# Patient Record
Sex: FEMALE | Race: WHITE | HISPANIC OR LATINO | Employment: PART TIME | URBAN - METROPOLITAN AREA
[De-identification: names, ages, dates, MRNs, and addresses within clinical notes are randomized per-mention and may not be internally consistent; named-entity substitution may affect disease eponyms.]

---

## 2019-09-13 ENCOUNTER — OCCUPATIONAL HEALTH (OUTPATIENT)
Dept: URGENT CARE | Facility: CLINIC | Age: 39
End: 2019-09-13

## 2019-09-13 DIAGNOSIS — Z02.1 PRE-EMPLOYMENT EXAMINATION: Primary | ICD-10-CM

## 2019-09-13 PROCEDURE — 86799 MMR TITER: ICD-10-PCS | Mod: S$GLB,,, | Performed by: PREVENTIVE MEDICINE

## 2019-09-13 PROCEDURE — 86480 QUANTIFERON GOLD TB: ICD-10-PCS | Mod: S$GLB,,, | Performed by: PREVENTIVE MEDICINE

## 2019-09-13 PROCEDURE — 86706 HEP B SURFACE ANTIBODY: CPT | Mod: S$GLB,,, | Performed by: PREVENTIVE MEDICINE

## 2019-09-13 PROCEDURE — 86787 VARICELLA-ZOSTER ANTIBODY: CPT | Mod: ,,, | Performed by: PREVENTIVE MEDICINE

## 2019-09-13 PROCEDURE — 86799 MMR TITER: CPT | Mod: S$GLB,,, | Performed by: PREVENTIVE MEDICINE

## 2019-09-13 PROCEDURE — 86480 TB TEST CELL IMMUN MEASURE: CPT | Mod: S$GLB,,, | Performed by: PREVENTIVE MEDICINE

## 2019-09-13 PROCEDURE — 86787 PR  VARICELLA-ZOSTER: ICD-10-PCS | Mod: ,,, | Performed by: PREVENTIVE MEDICINE

## 2019-09-13 PROCEDURE — 86706 PR  HEPATITIS B SURFACE AB TEST: ICD-10-PCS | Mod: S$GLB,,, | Performed by: PREVENTIVE MEDICINE

## 2019-09-16 LAB
GAMMA INTERFERON BACKGROUND BLD IA-ACNC: 0.03 IU/ML
HBV SURFACE AB SER-ACNC: 65 MIU/ML
M TB IFN-G BLD-IMP: NEGATIVE
M TB IFN-G CD4+ BCKGRND COR BLD-ACNC: 0.03 IU/ML
MEV IGG SER IA-ACNC: <13.5 AU/ML
MITOGEN IGNF BLD-ACNC: >10 IU/ML
MUV IGG SER IA-ACNC: 90.9 AU/ML
QUANTIFERON TB GOLD (INCUBATED): NORMAL
QUANTIFERON TB2 AG VALUE: 0.03 IU/ML
RUBV IGG SERPL IA-ACNC: 5.32 INDEX
SERVICE CMNT-IMP: NORMAL
VZV IGG SER IA-ACNC: 518 INDEX

## 2020-09-01 PROBLEM — M79.645 BILATERAL THUMB PAIN: Status: ACTIVE | Noted: 2020-09-01

## 2020-09-01 PROBLEM — M79.644 BILATERAL THUMB PAIN: Status: ACTIVE | Noted: 2020-09-01

## 2021-05-04 ENCOUNTER — PATIENT MESSAGE (OUTPATIENT)
Dept: RESEARCH | Facility: HOSPITAL | Age: 41
End: 2021-05-04

## 2024-09-19 ENCOUNTER — OFFICE VISIT (OUTPATIENT)
Dept: HEMATOLOGY/ONCOLOGY | Facility: CLINIC | Age: 44
End: 2024-09-19
Payer: COMMERCIAL

## 2024-09-19 VITALS
DIASTOLIC BLOOD PRESSURE: 79 MMHG | WEIGHT: 150.13 LBS | TEMPERATURE: 98 F | HEART RATE: 74 BPM | SYSTOLIC BLOOD PRESSURE: 117 MMHG | BODY MASS INDEX: 30.27 KG/M2 | OXYGEN SATURATION: 99 % | HEIGHT: 59 IN

## 2024-09-19 DIAGNOSIS — Z17.0 MALIGNANT NEOPLASM OF CENTRAL PORTION OF LEFT BREAST IN FEMALE, ESTROGEN RECEPTOR POSITIVE: Primary | ICD-10-CM

## 2024-09-19 DIAGNOSIS — C79.89 SECONDARY MALIGNANT NEOPLASM OF AXILLA: ICD-10-CM

## 2024-09-19 DIAGNOSIS — C50.112 MALIGNANT NEOPLASM OF CENTRAL PORTION OF LEFT BREAST IN FEMALE, ESTROGEN RECEPTOR POSITIVE: Primary | ICD-10-CM

## 2024-09-19 PROCEDURE — 99999 PR PBB SHADOW E&M-NEW PATIENT-LVL III: CPT | Mod: PBBFAC,,, | Performed by: INTERNAL MEDICINE

## 2024-09-19 PROCEDURE — 99205 OFFICE O/P NEW HI 60 MIN: CPT | Mod: S$GLB,,, | Performed by: INTERNAL MEDICINE

## 2024-09-19 PROCEDURE — G2211 COMPLEX E/M VISIT ADD ON: HCPCS | Mod: S$GLB,,, | Performed by: INTERNAL MEDICINE

## 2024-09-19 RX ORDER — ESCITALOPRAM OXALATE 10 MG/1
10 TABLET ORAL
COMMUNITY
Start: 2021-08-16

## 2024-09-19 NOTE — PLAN OF CARE
START ON PATHWAY REGIMEN - Breast    [id number]        Cyclophosphamide       Docetaxel (Taxotere)     **Always confirm dose/schedule in your pharmacy ordering system**    Patient Characteristics:  Postoperative without Neoadjuvant Therapy, M0 (Pathologic Staging), Invasive   Disease, Adjuvant Therapy, HER2 Negative, ER Positive, Node Positive, Node   Positive (1-3), Oncotyping Ordered, Oncotype Low/Intermediate Risk (? 25),   Premenopausal, Chemotherapy Candidate  Therapeutic Status: Postoperative without Neoadjuvant Therapy, M0 (Pathologic   Staging)  AJCC Grade: G2  AJCC N Category: pN1a  AJCC M Category: cM0  ER Status: Positive (+)  AJCC 8 Stage Grouping: IA  HER2 Status: Equivocal  Oncotype Dx Recurrence Score: Not Appropriate  AJCC T Category: pT1c  DC Status: Positive (+)  Has this patient completed genomic testing<= Yes - Oncotype DX(R)  Menopausal Status: Premenopausal  Intent of Therapy:  Curative Intent, Discussed with Patient

## 2024-09-19 NOTE — PROGRESS NOTES
[facility]/ [facility]   at [facility] Note:      Chief Complaint:   Encounter Diagnoses   Name Primary?    Malignant neoplasm of central portion of left breast in female, estrogen receptor positive Yes    Secondary malignant neoplasm of axilla         Cancer Staging   Malignant neoplasm of central portion of left breast in female, estrogen receptor positive  Staging form: Breast, AJCC 8th Edition  - Pathologic stage from 2024: Stage IA (pT1c, pN1a, cM0, G2, ER+, ND+, HER2: Equivocal) - Signed by Ana Shipley MD on 2024      HPI:  Bhargavi Gonzalez is a 44 y.o. female who presents today for evaluation of newly diagnosed breast cancer.     Oncology History  Patient self palpated a left breast mass in 2023 but had delay due to relocation  Bilateral breast ultrasound 24 showed L breast nodule at 6oc posterior to NAC     L breast bx 24: IDC, grade 2, ER >95%/ ND >95%/ HER2 1+; Ki67 5%    Bilateral mastectomy SLNBx 24: IDC, grade 2, 1.7cm maximal dimension; 1/1 LN+ with EXE  L axillary dissection 0/8 LN+     Invitae 7/15/24 negative     Gyn History:   Menarche: 10  Menopause: premenopause    Age at first pregnancy: 21      Social History     Tobacco Use    Smoking status: Never    Smokeless tobacco: Never   Substance Use Topics    Alcohol use: Not Currently     Family History   Problem Relation Name Age of Onset    Diabetes Mother      Hypertension Mother      Depression Sister      No Known Problems Brother      Arthritis Maternal Grandmother      Hypertension Maternal Grandmother       Past Medical History:   Diagnosis Date    Shoulder pain, left     no known injury     Past Surgical History:   Procedure Laterality Date    BREAST BIOPSY Right 2017    fibroadenoma    TUBAL LIGATION         Patient Active Problem List   Diagnosis    Bilateral thumb pain    Malignant neoplasm of central portion of left breast in  "female, estrogen receptor positive       Current Outpatient Medications   Medication Instructions    amoxicillin-clavulanate 500-125mg (AUGMENTIN) 500-125 mg Tab No dose, route, or frequency recorded.    EScitalopram oxalate (LEXAPRO) 10 mg, Oral    fluticasone propionate (FLONASE) 50 mcg, Each Nostril, Daily    loratadine (CLARITIN) 10 mg, Oral, Daily    meclizine (ANTIVERT) 25 mg, Oral, 3 times daily PRN       Review of Systems:   Review of Systems   Constitutional: Negative.    HENT: Negative.     Respiratory: Negative.     Cardiovascular: Negative.    Gastrointestinal: Negative.    Musculoskeletal: Negative.    Endo/Heme/Allergies: Negative.    All other systems reviewed and are negative.      PHYSICAL EXAM:  /79 (BP Location: Right arm, Patient Position: Sitting, BP Method: Medium (Automatic))   Pulse 74   Temp 98 °F (36.7 °C) (Oral)   Ht 4' 11" (1.499 m)   Wt 68.1 kg (150 lb 2.1 oz)   SpO2 99%   BMI 30.32 kg/m²     General Appearance:    Alert, cooperative, no distress, appears stated age   Head:    Normocephalic, without obvious abnormality, atraumatic   Eyes:    PERRL, conjunctiva/corneas clear   Nose:   Nares normal, septum midline   Throat:   Lips, mucosa, and tongue normal; teeth and gums normal   Lungs:     Respirations unlabored   Extremities:   Extremities normal, atraumatic, no cyanosis or edema   Pulses:   2+ and symmetric all extremities   Skin:   Skin color, texture, turgor normal, no rashes or lesions   Neurologic:   CNII-XII intact, normal gait         Pertinent Labs & Imaging:  Pathology Results  (Last 30 days)      None            No results found for this or any previous visit (from the past 24 hour(s)).    Assessment & Plan:    1. 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 Secondary malignant neoplasm of axilla    Reviewed patients referring notes, imaging and pathology. Discussed diagnosis, staging, and treatment in detail " with patient.   Patient with stage I ER+ breast cancer with 1/9 LN+  Long discussion of  vs SOC TC x 4   Patient previously decided for TC x 4 and feels more comfortable with this decision. Given this, will opt against oncotype testing  We discussed treatment with Taxotere 75mg/m2 and Cytoxan 600mg/m2 q3wk x4. Discussed the results of Joint Analysis of ABC Trials presented 6/4/16 at [facility]. Overall side effects of chemotherapy were discussed including alopecia (loss of hair),  immunosuppression, Neutropenia (low white count), anemia (low hemoglobin), thrombocytopenia (lowered platelets), and neuropathy (especially of the fingers and toes) from Taxotere. Other side effects such as nail changes, dry and itchy eyes, mouth sores, and bone pain were discussed. The need for growth factor support was also discussed. Neulasta related bone pain was discussed. Rare but serious side effects  such as increased risk of secondary leukemia with Cytoxan were also discussed as well.     Patient prefers against port placement. Discussed possible scarring.     Follow up for first chemotherapy       Route Chart for Scheduling    Med Onc Chart Routing      Follow up with physician . First day of chemo   Follow up with RAMYA . Virtual chemo teaching prior to 9/26   Infusion scheduling note   chemo start 9/27 if possible   Injection scheduling note    Labs CBC, CMP and B HCG   Scheduling:  Preferred lab:  Lab interval:     Imaging    Pharmacy appointment    Other referrals                       Total time of this visit, including time spent face to face with patient and/or via video/audio, and also in preparing for today's visit for MDM and documentation. (Medical Decision Making, including consideration of possible diagnoses, management options, complex medical record review, review of diagnostic tests and information, consideration and discussion of significant complications based on comorbidities, and discussion with providers  involved with the care of the patient) 60 minutes. Greater than 50% was spent face to face with the patient counseling and coordinating care.      Ana Shipley MD   09/19/2024

## 2024-09-24 ENCOUNTER — TELEPHONE (OUTPATIENT)
Dept: INFUSION THERAPY | Facility: HOSPITAL | Age: 44
End: 2024-09-24
Payer: COMMERCIAL

## 2024-09-25 ENCOUNTER — OFFICE VISIT (OUTPATIENT)
Dept: HEMATOLOGY/ONCOLOGY | Facility: CLINIC | Age: 44
End: 2024-09-25
Payer: COMMERCIAL

## 2024-09-25 DIAGNOSIS — T45.1X5A CHEMOTHERAPY-INDUCED NAUSEA: ICD-10-CM

## 2024-09-25 DIAGNOSIS — R11.0 CHEMOTHERAPY-INDUCED NAUSEA: ICD-10-CM

## 2024-09-25 DIAGNOSIS — Z17.0 MALIGNANT NEOPLASM OF CENTRAL PORTION OF LEFT BREAST IN FEMALE, ESTROGEN RECEPTOR POSITIVE: Primary | ICD-10-CM

## 2024-09-25 DIAGNOSIS — G62.0 CHEMOTHERAPY-INDUCED NEUROPATHY: ICD-10-CM

## 2024-09-25 DIAGNOSIS — C50.112 MALIGNANT NEOPLASM OF CENTRAL PORTION OF LEFT BREAST IN FEMALE, ESTROGEN RECEPTOR POSITIVE: Primary | ICD-10-CM

## 2024-09-25 DIAGNOSIS — T45.1X5A CHEMOTHERAPY-INDUCED NEUROPATHY: ICD-10-CM

## 2024-09-25 PROCEDURE — 99215 OFFICE O/P EST HI 40 MIN: CPT | Mod: 95,,, | Performed by: NURSE PRACTITIONER

## 2024-09-25 PROCEDURE — G2211 COMPLEX E/M VISIT ADD ON: HCPCS | Mod: 95,,, | Performed by: NURSE PRACTITIONER

## 2024-09-25 RX ORDER — ONDANSETRON HYDROCHLORIDE 8 MG/1
8 TABLET, FILM COATED ORAL EVERY 8 HOURS PRN
Qty: 30 TABLET | Refills: 2 | Status: SHIPPED | OUTPATIENT
Start: 2024-09-25 | End: 2025-09-25

## 2024-09-25 RX ORDER — PROCHLORPERAZINE MALEATE 5 MG
10 TABLET ORAL EVERY 6 HOURS PRN
Qty: 20 TABLET | Refills: 11 | Status: SHIPPED | OUTPATIENT
Start: 2024-09-25

## 2024-09-25 RX ORDER — OLANZAPINE 5 MG/1
TABLET ORAL
Qty: 3 TABLET | Refills: 5 | Status: SHIPPED | OUTPATIENT
Start: 2024-09-25

## 2024-09-25 NOTE — Clinical Note
She is on the wait list for Friday, but would prefer to start sometime next week if possible.  Michaela, she will need labs the day before.  If we can get a provider visit great, if not that is fine since she just saw me.  Thanks.

## 2024-09-25 NOTE — PROGRESS NOTES
[facility]/ [facility]   at [facility] Note:    The patient location is: home/la  The chief complaint leading to consultation is: chemo education    Visit type: audiovisual    Face to Face time with patient: 45  60 minutes of total time spent on the encounter, which includes face to face time and non-face to face time preparing to see the patient (eg, review of tests), Obtaining and/or reviewing separately obtained history, Documenting clinical information in the electronic or other health record, Independently interpreting results (not separately reported) and communicating results to the patient/family/caregiver, or Care coordination (not separately reported).     Each patient to whom he or she provides medical services by telemedicine is:  (1) informed of the relationship between the physician and patient and the respective role of any other health care provider with respect to management of the patient; and (2) notified that he or she may decline to receive medical services by telemedicine and may withdraw from such care at any time.    Notes:     Chief Complaint:   Encounter Diagnoses   Name Primary?    Malignant neoplasm of central portion of left breast in female, estrogen receptor positive Yes    Chemotherapy-induced nausea         Cancer Staging   Malignant neoplasm of central portion of left breast in female, estrogen receptor positive  Staging form: Breast, AJCC 8th Edition  - Pathologic stage from 8/2/2024: Stage IA (pT1c, pN1a, cM0, G2, ER+, IA+, HER2: Equivocal) - Signed by Ana Shipley MD on 9/19/2024      HPI:  Bhargavi Gonzalez is a 44 y.o. female who presents today for evaluation of newly diagnosed breast cancer.     Per Dr. Shipley's note:   Oncology History  Patient self palpated a left breast mass in December 2023 but had delay due to relocation  Bilateral breast ultrasound 6/21/24 showed L breast nodule at 6oc posterior to NAC     L  breast bx 24: IDC, grade 2, ER >95%/ AL >95%/ HER2 1+; Ki67 5%    Bilateral mastectomy SLNBx 24: IDC, grade 2, 1.7cm maximal dimension; 1/ LN+ with EXE  L axillary dissection 0 LN+     Invitae 7/15/24 negative     Per pt she recently received a PET scan in [place].  She does not have the results yet, but will get them to us when she does    Gyn History:   Menarche: 10  Menopause: premenopause    Age at first pregnancy: 21      Social History     Tobacco Use    Smoking status: Never    Smokeless tobacco: Never   Substance Use Topics    Alcohol use: Not Currently     Family History   Problem Relation Name Age of Onset    Diabetes Mother      Hypertension Mother      Depression Sister      No Known Problems Brother      Arthritis Maternal Grandmother      Hypertension Maternal Grandmother       Past Medical History:   Diagnosis Date    Shoulder pain, left     no known injury     Past Surgical History:   Procedure Laterality Date    BREAST BIOPSY Right 2017    fibroadenoma    TUBAL LIGATION         Patient Active Problem List   Diagnosis    Bilateral thumb pain    Malignant neoplasm of central portion of left breast in female, estrogen receptor positive       Current Outpatient Medications   Medication Instructions    amoxicillin-clavulanate 500-125mg (AUGMENTIN) 500-125 mg Tab No dose, route, or frequency recorded.    EScitalopram oxalate (LEXAPRO) 10 mg, Oral    fluticasone propionate (FLONASE) 50 mcg, Each Nostril, Daily    loratadine (CLARITIN) 10 mg, Oral, Daily    meclizine (ANTIVERT) 25 mg, Oral, 3 times daily PRN    OLANZapine (ZYPREXA) 5 MG tablet Take 1 tablet by mouth nightly on days 1-3 of each chemotherapy cycle.    ondansetron (ZOFRAN) 8 mg, Oral, Every 8 hours PRN    prochlorperazine (COMPAZINE) 10 mg, Oral, Every 6 hours PRN       Review of Systems:   Review of Systems   Constitutional: Negative.    HENT: Negative.     Respiratory: Negative.     Cardiovascular: Negative.     Gastrointestinal: Negative.    Musculoskeletal: Negative.    Endo/Heme/Allergies: Negative.    All other systems reviewed and are negative.      PHYSICAL EXAM:  There were no vitals taken for this visit.    Limited pe d/t virtual visit    Physical Exam  Constitutional:       General: She is not in acute distress.     Appearance: Normal appearance. She is not ill-appearing.   HENT:      Head: Normocephalic and atraumatic.   Pulmonary:      Effort: Pulmonary effort is normal. No respiratory distress.   Musculoskeletal:      Cervical back: Normal range of motion.   Neurological:      Mental Status: She is alert and oriented to person, place, and time.         Pertinent Labs & Imaging:  Pathology Results  (Last 30 days)      None            No results found for this or any previous visit (from the past 24 hour(s)).    Assessment & Plan:    1. Malignant neoplasm of central portion of left breast in female, estrogen receptor positive  - OLANZapine (ZYPREXA) 5 MG tablet; Take 1 tablet by mouth nightly on days 1-3 of each chemotherapy cycle.  Dispense: 3 tablet; Refill: 5  - prochlorperazine (COMPAZINE) 5 MG tablet; Take 2 tablets (10 mg total) by mouth every 6 (six) hours as needed for Nausea.  Dispense: 20 tablet; Refill: 11  - ondansetron (ZOFRAN) 8 MG tablet; Take 1 tablet (8 mg total) by mouth every 8 (eight) hours as needed for Nausea.  Dispense: 30 tablet; Refill: 2    2. Chemotherapy-induced nausea  - ondansetron (ZOFRAN) 8 MG tablet; Take 1 tablet (8 mg total) by mouth every 8 (eight) hours as needed for Nausea.  Dispense: 30 tablet; Refill: 2    Per Dr. Shipley's note:   Reviewed patients referring notes, imaging and pathology. Discussed diagnosis, staging, and treatment in detail with patient.   Patient with stage I ER+ breast cancer with 1/9 LN+  Long discussion of  vs SOC TC x 4   Patient previously decided for TC x 4 and feels more comfortable with this decision. Given this, will opt against oncotype  testing  We discussed treatment with Taxotere 75mg/m2 and Cytoxan 600mg/m2 q3wk x4. Discussed the results of Joint Analysis of ABC Trials presented 6/4/16 at [facility]. Overall side effects of chemotherapy were discussed including alopecia (loss of hair),  immunosuppression, Neutropenia (low white count), anemia (low hemoglobin), thrombocytopenia (lowered platelets), and neuropathy (especially of the fingers and toes) from Taxotere. Other side effects such as nail changes, dry and itchy eyes, mouth sores, and bone pain were discussed. The need for growth factor support was also discussed. Neulasta related bone pain was discussed. Rare but serious side effects  such as increased risk of secondary leukemia with Cytoxan were also discussed as well.     Patient prefers against port placement. Discussed possible scarring.   Follow up for first chemotherapy   Pt had a PET scan recently done in [place].  She will get us these results.  Continue with C1D1 of TC as planned  Will refer for IO and acupuncture  She plans on using cool cap    Bhargavi Gonzalez was consented for chemotherapy/immunotherapy to treat breast cancer. Bhargavi Gonzalez was consented to receive taxotere, cyclophosphamide.   The consent was discussed and reviewed with patient.  Consent reviewed today, but not signed d/t virtual visit    2. Patient was education on what to expect when receiving chemotherapy including: checking in, receiving an ID band, 1 guest allowed in the infusion suite during infusion, can alternate people as well, pole going with you once you are hooked up, warm blankets are available, you may bring lunch and snacks, minimal snacks are available, take medications as regularly unless told otherwise, warm blankets, will be available. Education about what to expect during their chemo cycle and how often their regimen is given.     3. An extensive discussion was had which included a thorough discussion of the risk and benefits of treatment  and alternatives.  Risks, including but not limited to, possible hair loss, bone marrow damage (anemia, thrombocytopenia, immune suppression, neutropenia), damage to body organs (brain, heart, liver, kidney, lungs, nervous system, skin, and others), allergic reactions, sterility, nausea/vomiting, constipation/diarrhea, sores in the mouth, secondary cancers, local damage at possible injection sites, and rarely death were all discussed. Specific side effects pertaining to their chemotherapy/immunotherapy medications were discussed as well.The patient agrees with the plan, and all questions and their support system's questions have been answered to their satisfaction. Contraindications and potential side effects discussed as listed in micromedx.     4. Patient was given binder which includes: contact information for the [facility], an immunotherapy side effect guide (if applicable to patient), resources including, but not limited to: women's wellness, acupuncture, physical therapy, , urgent care within oncology and financial assistance.     5. Premedications were prescribed and patient was education on appropriate premedications.     6. Patient was tested for pharmacogenomics if receiving fluorouracil or capecitabine. Patient has a UPT ordered if still of childrearing age and still has ovaries and uterus.     7. Genetics testing was done, if appropriate on this patient. Not all patients may qualify for genetic testing.     8. Patient was educated on when to call (and given the numbers to call and knows to message via [facility] if possible) or notify the provider including, but not limited to:   Persistent Nausea and/or Vomiting  Dehydration  Persistent Diarrhea  Fever of 100.4 > 1 hour in duration or any isolated fever > 101   Rash (while on active chemotherapy or immunotherapy)   Severe pain or new onset pain not controlled by current medication regimen  Or any other symptom you feel is related  to your current hematology or oncology treatment    9. Patient was provided with additional resources that [facility] offers including, but not limited to: financial counseling, , psychologist, palliative care, support groups, transportation, dietician, rehab services, women's wellness and urgent care visits within the oncology department.     10. Patient was offered and will consider chemo care companion. Educated on daily vital signs and daily questionnaire.     11. Patient has an established PCP in [place]    12. Patient was offered a virtual visit or a phone call 1 week post their Cycle 1 Day 1 chemotherapy and agreed         Patient will Proceed with cycle 1 day 1 of TC. Patient will be seen ~1 week for a post chemo visit with JUSTINE.       Route Chart for Scheduling    Med Onc Chart Routing  Urgent    Follow up with physician 1 week. next week wtih MD or JUSTINE for chemo start, then every 3 weeks alternating with md and justine   Follow up with JUSTINE . Virtual visit 1 week after C1D1   Infusion scheduling note   every 3 weeks x4 (patient prefers to start next week) she is cool capping   Injection scheduling note every 3 weeks x 4, Day 2   Labs CBC and CMP   Scheduling:  Preferred lab:  Lab interval:  eveyr 3 weeks x 4, day prior to infusion   Imaging    Pharmacy appointment    Other referrals                  Treatment Plan Information   OP BREAST TC (DOCEtaxel cycloPHOSphamide) Q3W Ana Shipley MD   Associated diagnosis: Malignant neoplasm of central portion of left breast in female, estrogen receptor positive Stage IA pT1c, pN1a, cM0, G2, ER+, NE+, HER2: Equivocal noted on 9/19/2024   Line of treatment: Adjuvant  Treatment Goal: Curative     Upcoming Treatment Dates - OP BREAST TC (DOCEtaxel cycloPHOSphamide) Q3W    10/3/2024       Pre-Medications       palonosetron 0.25mg/dexAMETHasone 20mg in NS IVPB 0.25 mg 50 mL       Chemotherapy       DOCEtaxel (TAXOTERE) 75 mg/m2 = 126 mg in 0.9% NaCl 256.3 mL  chemo infusion       cycloPHOSphamide 600 mg/m2 = 1,000 mg in 0.9% NaCl 255 mL chemo infusion  10/4/2024       Growth Factor       pegfilgrastim-fpgk (Stimufend) injection 6 mg  10/24/2024       Pre-Medications       palonosetron 0.25mg/dexAMETHasone 20mg in NS IVPB 0.25 mg 50 mL       Chemotherapy       DOCEtaxel (TAXOTERE) 75 mg/m2 = 126 mg in 0.9% NaCl 256.3 mL chemo infusion       cycloPHOSphamide 600 mg/m2 = 1,000 mg in 0.9% NaCl 255 mL chemo infusion  10/25/2024       Growth Factor       pegfilgrastim-fpgk (Stimufend) injection 6 mg      Total time of this visit, including time spent face to face with patient and/or via video/audio, and also in preparing for today's visit for MDM and documentation. (Medical Decision Making, including consideration of possible diagnoses, management options, complex medical record review, review of diagnostic tests and information, consideration and discussion of significant complications based on comorbidities, and discussion with providers involved with the care of the patient) 60 minutes. Greater than 50% was spent face to face with the patient counseling and coordinating care.      Regina So, NP   09/25/2024

## 2024-09-26 ENCOUNTER — TELEPHONE (OUTPATIENT)
Dept: HEMATOLOGY/ONCOLOGY | Facility: CLINIC | Age: 44
End: 2024-09-26
Payer: COMMERCIAL

## 2024-09-26 ENCOUNTER — PATIENT MESSAGE (OUTPATIENT)
Dept: HEMATOLOGY/ONCOLOGY | Facility: CLINIC | Age: 44
End: 2024-09-26
Payer: COMMERCIAL

## 2024-09-26 NOTE — TELEPHONE ENCOUNTER
LVM in regards to scheduling Integrative Referral placed by Dr. MARK So MA instructed pt. in voicemail to call the office number for scheduling.       [phone]

## 2024-09-27 ENCOUNTER — TELEPHONE (OUTPATIENT)
Dept: HEMATOLOGY/ONCOLOGY | Facility: CLINIC | Age: 44
End: 2024-09-27
Payer: COMMERCIAL

## 2024-09-27 NOTE — TELEPHONE ENCOUNTER
Spoke to pt. in regards to referral for Integrative Oncology placed by Regina So NP. Pt. approved virtual appt for Monday 10/7 @ 2PM with Susie Hoffman NP. MA advised pt. that this is an introductory visit whereby the provider will review pt's overall health and discuss the therapies that the dept has to offer. Pt acknowledged and stated she is familiar with utilizing portal for virtual appts.       [place], [place] Ext [phone]

## 2024-09-30 RX ORDER — HEPARIN 100 UNIT/ML
500 SYRINGE INTRAVENOUS
Status: CANCELLED | OUTPATIENT
Start: 2024-10-04

## 2024-09-30 RX ORDER — SODIUM CHLORIDE 0.9 % (FLUSH) 0.9 %
10 SYRINGE (ML) INJECTION
Status: CANCELLED | OUTPATIENT
Start: 2024-10-04

## 2024-09-30 RX ORDER — EPINEPHRINE 0.3 MG/.3ML
0.3 INJECTION SUBCUTANEOUS ONCE AS NEEDED
Status: CANCELLED | OUTPATIENT
Start: 2024-10-04

## 2024-09-30 RX ORDER — PROCHLORPERAZINE EDISYLATE 5 MG/ML
10 INJECTION INTRAMUSCULAR; INTRAVENOUS ONCE AS NEEDED
Status: CANCELLED | OUTPATIENT
Start: 2024-10-04

## 2024-09-30 RX ORDER — DIPHENHYDRAMINE HYDROCHLORIDE 50 MG/ML
50 INJECTION INTRAMUSCULAR; INTRAVENOUS ONCE AS NEEDED
Status: CANCELLED | OUTPATIENT
Start: 2024-10-04

## 2024-10-03 ENCOUNTER — TELEPHONE (OUTPATIENT)
Dept: HEMATOLOGY/ONCOLOGY | Facility: CLINIC | Age: 44
End: 2024-10-03
Payer: COMMERCIAL

## 2024-10-03 ENCOUNTER — LAB VISIT (OUTPATIENT)
Dept: LAB | Facility: HOSPITAL | Age: 44
End: 2024-10-03
Attending: INTERNAL MEDICINE
Payer: COMMERCIAL

## 2024-10-03 ENCOUNTER — CLINICAL SUPPORT (OUTPATIENT)
Dept: REHABILITATION | Facility: HOSPITAL | Age: 44
End: 2024-10-03
Payer: COMMERCIAL

## 2024-10-03 DIAGNOSIS — Z17.0 MALIGNANT NEOPLASM OF CENTRAL PORTION OF LEFT BREAST IN FEMALE, ESTROGEN RECEPTOR POSITIVE: Primary | ICD-10-CM

## 2024-10-03 DIAGNOSIS — C50.112 MALIGNANT NEOPLASM OF CENTRAL PORTION OF LEFT BREAST IN FEMALE, ESTROGEN RECEPTOR POSITIVE: Primary | ICD-10-CM

## 2024-10-03 DIAGNOSIS — G62.0 CHEMOTHERAPY-INDUCED NEUROPATHY: ICD-10-CM

## 2024-10-03 DIAGNOSIS — Z17.0 MALIGNANT NEOPLASM OF CENTRAL PORTION OF LEFT BREAST IN FEMALE, ESTROGEN RECEPTOR POSITIVE: ICD-10-CM

## 2024-10-03 DIAGNOSIS — C79.89 SECONDARY MALIGNANT NEOPLASM OF AXILLA: ICD-10-CM

## 2024-10-03 DIAGNOSIS — T45.1X5A CHEMOTHERAPY-INDUCED NEUROPATHY: ICD-10-CM

## 2024-10-03 DIAGNOSIS — C50.112 MALIGNANT NEOPLASM OF CENTRAL PORTION OF LEFT BREAST IN FEMALE, ESTROGEN RECEPTOR POSITIVE: ICD-10-CM

## 2024-10-03 LAB
ALBUMIN SERPL BCP-MCNC: 3.4 G/DL (ref 3.5–5.2)
ALP SERPL-CCNC: 60 U/L (ref 55–135)
ALT SERPL W/O P-5'-P-CCNC: 18 U/L (ref 10–44)
ANION GAP SERPL CALC-SCNC: 4 MMOL/L (ref 8–16)
AST SERPL-CCNC: 15 U/L (ref 10–40)
BASOPHILS # BLD AUTO: 0.05 K/UL (ref 0–0.2)
BASOPHILS NFR BLD: 0.8 % (ref 0–1.9)
BILIRUB SERPL-MCNC: 0.3 MG/DL (ref 0.1–1)
BUN SERPL-MCNC: 13 MG/DL (ref 6–20)
CALCIUM SERPL-MCNC: 8.3 MG/DL (ref 8.7–10.5)
CHLORIDE SERPL-SCNC: 109 MMOL/L (ref 95–110)
CO2 SERPL-SCNC: 22 MMOL/L (ref 23–29)
CREAT SERPL-MCNC: 0.7 MG/DL (ref 0.5–1.4)
DIFFERENTIAL METHOD BLD: ABNORMAL
EOSINOPHIL # BLD AUTO: 0.3 K/UL (ref 0–0.5)
EOSINOPHIL NFR BLD: 5.5 % (ref 0–8)
ERYTHROCYTE [DISTWIDTH] IN BLOOD BY AUTOMATED COUNT: 14.5 % (ref 11.5–14.5)
EST. GFR  (NO RACE VARIABLE): >60 ML/MIN/1.73 M^2
GLUCOSE SERPL-MCNC: 88 MG/DL (ref 70–110)
HCG INTACT+B SERPL-ACNC: <2.4 MIU/ML
HCT VFR BLD AUTO: 32.4 % (ref 37–48.5)
HGB BLD-MCNC: 10.5 G/DL (ref 12–16)
IMM GRANULOCYTES # BLD AUTO: 0.02 K/UL (ref 0–0.04)
IMM GRANULOCYTES NFR BLD AUTO: 0.3 % (ref 0–0.5)
LYMPHOCYTES # BLD AUTO: 1.9 K/UL (ref 1–4.8)
LYMPHOCYTES NFR BLD: 30.5 % (ref 18–48)
MCH RBC QN AUTO: 27.4 PG (ref 27–31)
MCHC RBC AUTO-ENTMCNC: 32.4 G/DL (ref 32–36)
MCV RBC AUTO: 85 FL (ref 82–98)
MONOCYTES # BLD AUTO: 0.5 K/UL (ref 0.3–1)
MONOCYTES NFR BLD: 8 % (ref 4–15)
NEUTROPHILS # BLD AUTO: 3.4 K/UL (ref 1.8–7.7)
NEUTROPHILS NFR BLD: 54.9 % (ref 38–73)
NRBC BLD-RTO: 0 /100 WBC
PLATELET # BLD AUTO: 314 K/UL (ref 150–450)
PMV BLD AUTO: 9.8 FL (ref 9.2–12.9)
POTASSIUM SERPL-SCNC: 4.5 MMOL/L (ref 3.5–5.1)
PROT SERPL-MCNC: 6.6 G/DL (ref 6–8.4)
RBC # BLD AUTO: 3.83 M/UL (ref 4–5.4)
SODIUM SERPL-SCNC: 135 MMOL/L (ref 136–145)
WBC # BLD AUTO: 6.14 K/UL (ref 3.9–12.7)

## 2024-10-03 PROCEDURE — 85025 COMPLETE CBC W/AUTO DIFF WBC: CPT | Performed by: INTERNAL MEDICINE

## 2024-10-03 PROCEDURE — 97810 ACUP 1/> WO ESTIM 1ST 15 MIN: CPT | Performed by: ACUPUNCTURIST

## 2024-10-03 PROCEDURE — 80053 COMPREHEN METABOLIC PANEL: CPT | Performed by: INTERNAL MEDICINE

## 2024-10-03 PROCEDURE — 36415 COLL VENOUS BLD VENIPUNCTURE: CPT | Performed by: INTERNAL MEDICINE

## 2024-10-03 PROCEDURE — 97811 ACUP 1/> W/O ESTIM EA ADD 15: CPT | Performed by: ACUPUNCTURIST

## 2024-10-03 PROCEDURE — 84702 CHORIONIC GONADOTROPIN TEST: CPT | Performed by: INTERNAL MEDICINE

## 2024-10-03 NOTE — PROGRESS NOTES
Acupuncture Evaluation Note     Name: Bhargavi Gonzalez  Clinic Number: [id number]    Traditional Chinese Medicine (TCM) Diagnosis: Qi Stagnation, Blood Stasis, and Damp  Medical Diagnosis:   Encounter Diagnoses   Name Primary?    Malignant neoplasm of central portion of left breast in female, estrogen receptor positive     Chemotherapy-induced neuropathy         Evaluation Date: 10/3/2024    Visit #/Visits authorized: 15, 1/15    Precautions: cancer    Subjective     Chief Concern: Breast cancer of left breast, dx July 3rd. Bilateral mastectomy on 8/2 with follow up to remove 8 lymph nodes, one malignant, on 8/26. LOLY flap reconstruction to follow. CINV, CIPN preventative. Chronic low back pain and upper-mid back/shoulders, onset 5+ years ago.     Medical necessity is demonstrated by the following IMPAIRMENTS: Medical Necessity: Decreased quality of life              Aggravating Factors:  standing and prolonged sitting   Relieving Factors:  stretching, NSAIDs     Symptom Description:     Quality:  Aching  Severity:  4  Frequency:  every morning and every day    Previous Treatments Tried:  Chiropractic treatment and Acupuncture    HEENT:  no complaints     Chest:  no complaints     Digestion:     Diet: well balanced, on average, 1 fast food meals per week, leftovers from restaurants   Fluids:  recently none, usually coffee , is drinking plenty of fluids, 2 liquor drinks per week(s), usually much less  Taste/Appetite: normal    Symptoms: diarrhea, loose stools, and heartburn     Sleep: no complaints     Energy Levels:  up and down, fatigue in the afternoon    Psychological Symptoms:  some anxiety but minimal     Other Symptoms: bilateral tubal ligation     GYN Symptoms: Day 4 of cycle, medium flow, red color, no cramping or clots. Regular cycle     Objective     Observation:     Tongue:  thin white coat, red tip, center line crack, scalloped peeled edges     Body:     Color:     Coating:      Pulse:  cun excess,  slippery, dl thin, chi deep              New Findings:      Treatment     Treatment Principles:  Move qi and blood, transform damp, nourish qi, stop pain     Acupuncture points used:  4 YATES, BA JANE, Gb34, Ki3, Ki6, Li11, Lu7, Pc6, REN12, REN6, Sp6, Sp9, St25, St36, St40, and YIN SANCHEZ    Bilateral points:  Unilateral points:  Auricular Treatment:  knapp men    Needles In: 30  Needles Out: 30  Needles W/O STIM placed: 11:30  Needles W/O STIM removed: 11:50      Other Traditional Chinese Medicine Modalities - Cupping  Gwasha    Assessment     After treatment, patient felt good, relaxed     Patient prognosis is Good.     Patient will continue to benefit from acupuncture treatment to address the deficits listed in the problem list box on initial evaluation, provide patient family education and to maximize pt's level of independence in the home and community environment.     Patient's spiritual, cultural and educational needs considered and pt agreeable to plan of care and goals.     Anticipated barriers to treatment: none    Plan     Recommend 1 /week for 5 sessions then re-assess.      Education:  Patient is aware of cumulative benefit of acupuncture

## 2024-10-04 ENCOUNTER — INFUSION (OUTPATIENT)
Dept: INFUSION THERAPY | Facility: HOSPITAL | Age: 44
End: 2024-10-04
Payer: COMMERCIAL

## 2024-10-04 ENCOUNTER — TELEPHONE (OUTPATIENT)
Dept: HEMATOLOGY/ONCOLOGY | Facility: CLINIC | Age: 44
End: 2024-10-04
Payer: COMMERCIAL

## 2024-10-04 VITALS
SYSTOLIC BLOOD PRESSURE: 146 MMHG | RESPIRATION RATE: 18 BRPM | BODY MASS INDEX: 31.17 KG/M2 | TEMPERATURE: 98 F | HEIGHT: 59 IN | HEART RATE: 57 BPM | WEIGHT: 154.63 LBS | OXYGEN SATURATION: 100 % | DIASTOLIC BLOOD PRESSURE: 76 MMHG

## 2024-10-04 DIAGNOSIS — Z17.0 MALIGNANT NEOPLASM OF CENTRAL PORTION OF LEFT BREAST IN FEMALE, ESTROGEN RECEPTOR POSITIVE: Primary | ICD-10-CM

## 2024-10-04 DIAGNOSIS — C50.112 MALIGNANT NEOPLASM OF CENTRAL PORTION OF LEFT BREAST IN FEMALE, ESTROGEN RECEPTOR POSITIVE: Primary | ICD-10-CM

## 2024-10-04 PROCEDURE — 96367 TX/PROPH/DG ADDL SEQ IV INF: CPT

## 2024-10-04 PROCEDURE — 25000003 PHARM REV CODE 250: Performed by: INTERNAL MEDICINE

## 2024-10-04 PROCEDURE — 96413 CHEMO IV INFUSION 1 HR: CPT

## 2024-10-04 PROCEDURE — 96417 CHEMO IV INFUS EACH ADDL SEQ: CPT

## 2024-10-04 PROCEDURE — 63600175 PHARM REV CODE 636 W HCPCS: Mod: JG | Performed by: INTERNAL MEDICINE

## 2024-10-04 RX ORDER — PROCHLORPERAZINE EDISYLATE 5 MG/ML
10 INJECTION INTRAMUSCULAR; INTRAVENOUS ONCE AS NEEDED
Status: DISCONTINUED | OUTPATIENT
Start: 2024-10-04 | End: 2024-10-04 | Stop reason: HOSPADM

## 2024-10-04 RX ORDER — DIPHENHYDRAMINE HYDROCHLORIDE 50 MG/ML
50 INJECTION INTRAMUSCULAR; INTRAVENOUS ONCE AS NEEDED
Status: DISCONTINUED | OUTPATIENT
Start: 2024-10-04 | End: 2024-10-04 | Stop reason: HOSPADM

## 2024-10-04 RX ORDER — EPINEPHRINE 0.3 MG/.3ML
0.3 INJECTION SUBCUTANEOUS ONCE AS NEEDED
Status: DISCONTINUED | OUTPATIENT
Start: 2024-10-04 | End: 2024-10-04 | Stop reason: HOSPADM

## 2024-10-04 RX ORDER — HEPARIN 100 UNIT/ML
500 SYRINGE INTRAVENOUS
Status: DISCONTINUED | OUTPATIENT
Start: 2024-10-04 | End: 2024-10-04 | Stop reason: HOSPADM

## 2024-10-04 RX ORDER — SODIUM CHLORIDE 0.9 % (FLUSH) 0.9 %
10 SYRINGE (ML) INJECTION
Status: DISCONTINUED | OUTPATIENT
Start: 2024-10-04 | End: 2024-10-04 | Stop reason: HOSPADM

## 2024-10-04 RX ADMIN — CYCLOPHOSPHAMIDE 1000 MG: 200 INJECTION, SOLUTION INTRAVENOUS at 01:10

## 2024-10-04 RX ADMIN — DOCETAXEL 120 MG: 10 INJECTION, SOLUTION INTRAVENOUS at 12:10

## 2024-10-04 RX ADMIN — DEXAMETHASONE SODIUM PHOSPHATE 0.25 MG: 4 INJECTION, SOLUTION INTRA-ARTICULAR; INTRALESIONAL; INTRAMUSCULAR; INTRAVENOUS; SOFT TISSUE at 11:10

## 2024-10-04 RX ADMIN — SODIUM CHLORIDE: 9 INJECTION, SOLUTION INTRAVENOUS at 11:10

## 2024-10-04 NOTE — PLAN OF CARE
1000-Labs , hx, and medications reviewed. Assessment completed. Discussed plan of care with patient. Patient in agreement. Chair reclined and warm blanket and snack offered.

## 2024-10-04 NOTE — PLAN OF CARE
1615-Patient tolerated treatment well and completed 2 hours post cooling on dignicap after.  PIV was removed and site dressed. Discharged without complaints or S/S of adverse event. AVS given.  Instructed to call provider for any questions or concerns. Patient will return to clinic tomorrow for injection.

## 2024-10-04 NOTE — TELEPHONE ENCOUNTER
Spoke to pt to remind of appt on 10/7 with Susie. Pt verbalized understanding and confirmed appt Location was discussed.

## 2024-10-05 ENCOUNTER — INFUSION (OUTPATIENT)
Dept: INFUSION THERAPY | Facility: HOSPITAL | Age: 44
End: 2024-10-05
Attending: INTERNAL MEDICINE
Payer: COMMERCIAL

## 2024-10-05 DIAGNOSIS — C50.112 MALIGNANT NEOPLASM OF CENTRAL PORTION OF LEFT BREAST IN FEMALE, ESTROGEN RECEPTOR POSITIVE: Primary | ICD-10-CM

## 2024-10-05 DIAGNOSIS — Z17.0 MALIGNANT NEOPLASM OF CENTRAL PORTION OF LEFT BREAST IN FEMALE, ESTROGEN RECEPTOR POSITIVE: Primary | ICD-10-CM

## 2024-10-05 PROCEDURE — 63600175 PHARM REV CODE 636 W HCPCS: Mod: JZ,JG | Performed by: INTERNAL MEDICINE

## 2024-10-05 PROCEDURE — 96372 THER/PROPH/DIAG INJ SC/IM: CPT

## 2024-10-05 RX ADMIN — PEGFLILGRASTIM-FPGK 6 MG: 6 INJECTION, SOLUTION SUBCUTANEOUS at 12:10

## 2024-10-05 NOTE — PLAN OF CARE
Problem: Fatigue  Goal: Improved Activity Tolerance  Outcome: Progressing  Intervention: Promote Improved Energy  Flowsheets (Taken 10/5/2024 1256)  Fatigue Management:   activity schedule adjusted   activity assistance provided   fatigue-related activity identified   frequent rest breaks encouraged   paced activity encouraged  Sleep/Rest Enhancement:   awakenings minimized   consistent schedule promoted   family presence promoted   natural light exposure provided   noise level reduced   regular sleep/rest pattern promoted   relaxation techniques promoted   reading promoted  Activity Management:   Ambulated -L4   Up in chair - L3  Environmental Support:   calm environment promoted   caregiver consistency promoted   comfort object encouraged   distractions minimized   environmental consistency promoted   personal routine supported   rest periods encouraged Pleasant, alert and oriented patient to Chemo Infusion for Stimufend injection, administered to APRIL, band-aide applied, patient tolerated with no AVE's and patient discharged to home with no concerns - RTC on 10/25/24

## 2024-10-07 ENCOUNTER — TELEPHONE (OUTPATIENT)
Dept: HEMATOLOGY/ONCOLOGY | Facility: CLINIC | Age: 44
End: 2024-10-07
Payer: COMMERCIAL

## 2024-10-07 ENCOUNTER — PATIENT MESSAGE (OUTPATIENT)
Dept: HEMATOLOGY/ONCOLOGY | Facility: CLINIC | Age: 44
End: 2024-10-07
Payer: COMMERCIAL

## 2024-10-07 ENCOUNTER — OFFICE VISIT (OUTPATIENT)
Dept: HEMATOLOGY/ONCOLOGY | Facility: CLINIC | Age: 44
End: 2024-10-07
Payer: COMMERCIAL

## 2024-10-07 DIAGNOSIS — Z17.0 MALIGNANT NEOPLASM OF CENTRAL PORTION OF LEFT BREAST IN FEMALE, ESTROGEN RECEPTOR POSITIVE: ICD-10-CM

## 2024-10-07 DIAGNOSIS — T45.1X5A CHEMOTHERAPY-INDUCED NAUSEA: ICD-10-CM

## 2024-10-07 DIAGNOSIS — C50.112 MALIGNANT NEOPLASM OF CENTRAL PORTION OF LEFT BREAST IN FEMALE, ESTROGEN RECEPTOR POSITIVE: ICD-10-CM

## 2024-10-07 DIAGNOSIS — R11.0 CHEMOTHERAPY-INDUCED NAUSEA: ICD-10-CM

## 2024-10-07 DIAGNOSIS — F43.22 ANXIOUS MOOD AS ADJUSTMENT REACTION: Primary | ICD-10-CM

## 2024-10-07 PROCEDURE — 99214 OFFICE O/P EST MOD 30 MIN: CPT | Mod: 95,,, | Performed by: NURSE PRACTITIONER

## 2024-10-07 NOTE — PROGRESS NOTES
The patient location is: home  The chief complaint leading to consultation is: body aches, slight nausea    Visit type: audiovisual    Each patient to whom he or she provides medical services by telemedicine is:  (1) informed of the relationship between the physician and patient and the respective role of any other health care provider with respect to management of the patient; and (2) notified that he or she may decline to receive medical services by telemedicine and may withdraw from such care at any time.    Notes:   Bhargavi Gonzalez  44 y.o. is here to seek an integrative approach to discuss side effects related to breast cancer treatment. Bhargavi Gonzalez  was referred by Dr. So     HPI  Mrs. Gonzalez reports she felt a breast mass and went to see her gyn in [place]. She had a bilateral mastectomy August 2 in [place]. She had a positive sentinel node and had 8 additional nodes removed after her initial surgery. She was told she needed chemotherapy and decided to come home to [place]. She will have 4 cycles of chemo. She started last Friday and reports overall is doing well. She is using the cold cap and reports she did fine. She did have mild body aches and flu like symptoms due to the stimufend. She reports she did have slight nausea at night, but reports she did start tirzepatide which might be contributing to the nausea. She is sleeping well, 7-8 hours. She reports fatigue and states she has always had fatigue even in high school. She reports stress and anxiety rated at 3/10. She does have increase anxiety since her diagnosis and feels the Lexapro is helping her manage. She also reports she is doing better as she feels settled in [place] after moving back from [place] and is stable financially. She has a lower appetite due to tirzepatide. She is staying active with a 10 K step/day challenge and has gotten her steps in on all days except her chemo day. She is also using light weights.    She has 3 adult children. She moved to [place] to work but is in [place] for treatment. She is a registered nurse. She is currently on medication.     Pillars Assessment    Sleep  How many hours of sleep per night? 7-8 hours  Do you have trouble falling asleep, staying asleep or waking up earlier than you need to? no  Do you have daytime fatigue? yes  Do you need medication for sleep? no  Do you use any supplements or other interventions for sleep? yes, Melatonin    Resilience  Rate your current level of stress- low  How do you manage stress?  Lexapro    Purpose  Do you feel you have a vision or a life purpose? Yes    Nutrition   Food allergies or sensitivities: no  Do you adhere to a particular type of diet? no  Do you have any concerns with your eating habits? no    Exercise  How would you describe your physical activity level? moderate  Do you work at a sedentary job? no  What do you do for physical activity? walks    Past Medical History  Past Medical History:   Diagnosis Date    Shoulder pain, left 2016    no known injury      Past Surgical History   Past Surgical History:   Procedure Laterality Date    BREAST BIOPSY Right 11/2017    fibroadenoma    TUBAL LIGATION  2011      Family History   Family History   Problem Relation Name Age of Onset    Diabetes Mother      Hypertension Mother      Depression Sister      No Known Problems Brother      Arthritis Maternal Grandmother      Hypertension Maternal Grandmother        Allergies  Review of patient's allergies indicates:  No Known Allergies   Current Medications:    Current Outpatient Medications:     amoxicillin-clavulanate 500-125mg (AUGMENTIN) 500-125 mg Tab, , Disp: , Rfl:     EScitalopram oxalate (LEXAPRO) 10 MG tablet, Take 10 mg by mouth., Disp: , Rfl:     loratadine (CLARITIN) 10 mg tablet, Take 10 mg by mouth once daily., Disp: , Rfl:     OLANZapine (ZYPREXA) 5 MG tablet, Take 1 tablet by mouth nightly on days 1-3 of each chemotherapy cycle., Disp: 3  tablet, Rfl: 5    ondansetron (ZOFRAN) 8 MG tablet, Take 1 tablet (8 mg total) by mouth every 8 (eight) hours as needed for Nausea., Disp: 30 tablet, Rfl: 2    prochlorperazine (COMPAZINE) 5 MG tablet, Take 2 tablets (10 mg total) by mouth every 6 (six) hours as needed for Nausea., Disp: 20 tablet, Rfl: 11     Review of Systems  Review of Systems   Constitutional: Negative.    HENT: Negative.     Eyes: Negative.    Respiratory: Negative.     Cardiovascular: Negative.    Gastrointestinal:  Positive for nausea.   Genitourinary: Negative.    Musculoskeletal:  Positive for myalgias.   Skin: Negative.    Neurological: Negative.    Endo/Heme/Allergies: Negative.    Psychiatric/Behavioral:  The patient is nervous/anxious.       Physical Exam      There were no vitals filed for this visit.  There is no height or weight on file to calculate BMI.  Physical Exam  Vitals reviewed.   Constitutional:       Appearance: Normal appearance.   Neurological:      Mental Status: She is alert.   Psychiatric:         Mood and Affect: Mood normal.         Behavior: Behavior normal.      ASSESSMENT :  1. Anxious mood as adjustment reaction    2. Chemotherapy-induced nausea    3. Malignant neoplasm of central portion of left breast in female, estrogen receptor positive      PLAN:  Reviewed all information discussed at today's visit and all questions were answered.    Counseled on healthy lifestyle and behavior modifications   Referral to Nutrition  I discussed a nutritional consult with our dietician. The dietician can  help you work on weight management during treatment and further discuss lifestyle changes. Mrs. Gonzalez is interested in learning what foods to eat during chemo and education on an anti-inflammatory diet.   Continue Acupuncture  I discussed and recommended the following support services: (Calendars to be sent by DENISE GRIFFITH)  Yoga I suggested Yoga as these practices reduce stress, increases flexibility and muscle strength,  improves balance and promotes serenity in the power of movement to help fight disease and boost your immune system.   Meditation which can decrease stress by lowering blood pressure, slowing breathing, and helping you be more present in the moment. It improves sleep by relaxing the body and mind at the end of the day.Meditation also trains you how to focus on one thing at a time, improving concentration. It also promotes emotional well-being by decreasing depression and anxiety, and helping create a more positive outlook on life.    Follow up with [facility] in 6-8 weeks.     I spent a total of 32 minutes on the day of the visit.This includes face to face time and non-face to face time preparing to see the patient (eg, review of tests), obtaining and/or reviewing separately obtained history, documenting clinical information in the electronic or other health record, independently interpreting results and communicating results to the patient/family/caregiver, or care coordinator.

## 2024-10-08 ENCOUNTER — TELEPHONE (OUTPATIENT)
Dept: HEMATOLOGY/ONCOLOGY | Facility: CLINIC | Age: 44
End: 2024-10-08
Payer: COMMERCIAL

## 2024-10-08 ENCOUNTER — PATIENT MESSAGE (OUTPATIENT)
Dept: HEMATOLOGY/ONCOLOGY | Facility: CLINIC | Age: 44
End: 2024-10-08
Payer: COMMERCIAL

## 2024-10-08 ENCOUNTER — OFFICE VISIT (OUTPATIENT)
Dept: HEMATOLOGY/ONCOLOGY | Facility: CLINIC | Age: 44
End: 2024-10-08
Payer: COMMERCIAL

## 2024-10-08 DIAGNOSIS — C50.112 MALIGNANT NEOPLASM OF CENTRAL PORTION OF LEFT BREAST IN FEMALE, ESTROGEN RECEPTOR POSITIVE: Primary | ICD-10-CM

## 2024-10-08 DIAGNOSIS — Z17.0 MALIGNANT NEOPLASM OF CENTRAL PORTION OF LEFT BREAST IN FEMALE, ESTROGEN RECEPTOR POSITIVE: Primary | ICD-10-CM

## 2024-10-08 DIAGNOSIS — C79.89 SECONDARY MALIGNANT NEOPLASM OF AXILLA: ICD-10-CM

## 2024-10-08 DIAGNOSIS — T45.1X5A CHEMOTHERAPY INDUCED DIARRHEA: ICD-10-CM

## 2024-10-08 DIAGNOSIS — K52.1 CHEMOTHERAPY INDUCED DIARRHEA: ICD-10-CM

## 2024-10-08 DIAGNOSIS — G89.3 CANCER RELATED PAIN: ICD-10-CM

## 2024-10-08 PROCEDURE — 99214 OFFICE O/P EST MOD 30 MIN: CPT | Mod: 95,,, | Performed by: NURSE PRACTITIONER

## 2024-10-08 PROCEDURE — G2211 COMPLEX E/M VISIT ADD ON: HCPCS | Mod: 95,,, | Performed by: NURSE PRACTITIONER

## 2024-10-08 RX ORDER — DIPHENOXYLATE HYDROCHLORIDE AND ATROPINE SULFATE 2.5; .025 MG/1; MG/1
1 TABLET ORAL 4 TIMES DAILY PRN
Qty: 40 TABLET | Refills: 0 | Status: SHIPPED | OUTPATIENT
Start: 2024-10-08 | End: 2024-10-18

## 2024-10-08 RX ORDER — TRAMADOL HYDROCHLORIDE 50 MG/1
50 TABLET ORAL EVERY 6 HOURS PRN
Qty: 28 TABLET | Refills: 0 | Status: SHIPPED | OUTPATIENT
Start: 2024-10-08

## 2024-10-08 NOTE — TELEPHONE ENCOUNTER
----- Message from Dietitian Yvrose sent at 10/7/2024  2:35 PM CDT -----  Regarding: FW: CHemo diet  Please schedule soonest available  Thanks!  ----- Message -----  From: Susie Hoffman FNP-HIRO  Sent: 10/7/2024   2:33 PM CDT  To: Yvrose Adorno RD  Subject: CHemo diet                                       Yvrose,     I placed a referral for Bhargavi.  She needs to know what foods to avoid while on chemo. She was telling me she was going to start something with tumeric and honey.  I advised her against both at this time due to being on active treatment. She is also interested in anti inflammatory diet.     Thanks,  Merline

## 2024-10-08 NOTE — PROGRESS NOTES
[facility]/ [facility]   at [facility] Note:    The patient location is: home/la  The chief complaint leading to consultation is: 1 week post C1D1 of chemo    Visit type: audiovisual    Face to Face time with patient: 15 minutes  20 minutes of total time spent on the encounter, which includes face to face time and non-face to face time preparing to see the patient (eg, review of tests), Obtaining and/or reviewing separately obtained history, Documenting clinical information in the electronic or other health record, Independently interpreting results (not separately reported) and communicating results to the patient/family/caregiver, or Care coordination (not separately reported).     Each patient to whom he or she provides medical services by telemedicine is:  (1) informed of the relationship between the physician and patient and the respective role of any other health care provider with respect to management of the patient; and (2) notified that he or she may decline to receive medical services by telemedicine and may withdraw from such care at any time.    Notes:     Chief Complaint:   Encounter Diagnoses   Name Primary?    Secondary malignant neoplasm of axilla     Malignant neoplasm of central portion of left breast in female, estrogen receptor positive Yes    Cancer related pain     Chemotherapy induced diarrhea       Cancer Staging   Malignant neoplasm of central portion of left breast in female, estrogen receptor positive  Staging form: Breast, AJCC 8th Edition  - Pathologic stage from 8/2/2024: Stage IA (pT1c, pN1a, cM0, G2, ER+, MA+, HER2: Equivocal) - Signed by Ana Shipley MD on 9/19/2024      HPI:  Bhargavi Gonzalez is a 44 y.o. female who presents today for evaluation of newly diagnosed breast cancer.  She had C1D1 of TC on Friday (4 days ago).  She tolerated her infusion fairly well.  Noted D3 and D4 she had body aches and fatigue.  Had  mild nausea that was controlled with zofran.  She is having diarrhea, but was having this prior to chemo d/t ketone diet.  She also just recently started back on tirzepatide, which can alter her bowels.  Feeling better today than she did yesterday.  No fever or mouth sores.  Does have dry mouth    Per Dr. Shipley's note:   Oncology History  Patient self palpated a left breast mass in 2023 but had delay due to relocation  Bilateral breast ultrasound 24 showed L breast nodule at 6oc posterior to NAC     L breast bx 24: IDC, grade 2, ER >95%/ VT >95%/ HER2 1+; Ki67 5%    Bilateral mastectomy SLNBx 24: IDC, grade 2, 1.7cm maximal dimension; 1/ LN+ with EXE  L axillary dissection  LN+     Invitae 7/15/24 negative     Gyn History:   Menarche: 10  Menopause: premenopause    Age at first pregnancy: 21      Social History     Tobacco Use    Smoking status: Never    Smokeless tobacco: Never   Substance Use Topics    Alcohol use: Not Currently     Family History   Problem Relation Name Age of Onset    Diabetes Mother      Hypertension Mother      Depression Sister      No Known Problems Brother      Arthritis Maternal Grandmother      Hypertension Maternal Grandmother       Past Medical History:   Diagnosis Date    Shoulder pain, left     no known injury     Past Surgical History:   Procedure Laterality Date    BREAST BIOPSY Right 2017    fibroadenoma    TUBAL LIGATION         Patient Active Problem List   Diagnosis    Bilateral thumb pain    Malignant neoplasm of central portion of left breast in female, estrogen receptor positive    Chemotherapy-induced nausea    Anxious mood as adjustment reaction       Current Outpatient Medications   Medication Instructions    diphenoxylate-atropine 2.5-0.025 mg (LOMOTIL) 2.5-0.025 mg per tablet 1 tablet, Oral, 4 times daily PRN    EScitalopram oxalate (LEXAPRO) 10 mg    loratadine (CLARITIN) 10 mg, Daily    OLANZapine (ZYPREXA) 5 MG tablet Take 1  tablet by mouth nightly on days 1-3 of each chemotherapy cycle.    ondansetron (ZOFRAN) 8 mg, Oral, Every 8 hours PRN    prochlorperazine (COMPAZINE) 10 mg, Oral, Every 6 hours PRN    tirzepatide 5 mg, Subcutaneous, Every 7 days    traMADoL (ULTRAM) 50 mg, Oral, Every 6 hours PRN       Review of Systems:   Review of Systems   Constitutional: Negative.    HENT: Negative.     Respiratory: Negative.  Negative for cough and shortness of breath.    Cardiovascular: Negative.  Negative for chest pain.   Gastrointestinal: Negative.  Negative for abdominal pain and diarrhea.   Genitourinary:  Negative for frequency.   Musculoskeletal: Negative.  Negative for back pain.   Skin:  Negative for rash.   Neurological:  Negative for headaches.   Endo/Heme/Allergies: Negative.    Psychiatric/Behavioral:  The patient is not nervous/anxious.    All other systems reviewed and are negative.      PHYSICAL EXAM:  There were no vitals taken for this visit.    Limited pe d/t virtual visit    Physical Exam  Constitutional:       General: She is not in acute distress.     Appearance: Normal appearance. She is not ill-appearing.   HENT:      Head: Normocephalic and atraumatic.   Pulmonary:      Effort: Pulmonary effort is normal. No respiratory distress.   Musculoskeletal:      Cervical back: Normal range of motion.   Neurological:      Mental Status: She is alert and oriented to person, place, and time.       Pertinent Labs & Imaging:  Pathology Results  (Last 30 days)      None            No results found for this or any previous visit (from the past 24 hours).    Assessment & Plan:    Reviewed patients referring notes, imaging and pathology. Discussed diagnosis, staging, and treatment in detail with patient.   Patient with stage I ER+ breast cancer with 1/9 LN+  Long discussion of  vs SOC TC x 4   Patient previously decided for TC x 4 and feels more comfortable with this decision. Given this, will opt against oncotype testing  We  discussed treatment with Taxotere 75mg/m2 and Cytoxan 600mg/m2 q3wk x4. Discussed the results of Joint Analysis of ABC Trials presented 6/4/16 at [facility]. Overall side effects of chemotherapy were discussed including alopecia (loss of hair),  immunosuppression, Neutropenia (low white count), anemia (low hemoglobin), thrombocytopenia (lowered platelets), and neuropathy (especially of the fingers and toes) from Taxotere. Other side effects such as nail changes, dry and itchy eyes, mouth sores, and bone pain were discussed. The need for growth factor support was also discussed. Neulasta related bone pain was discussed. Rare but serious side effects  such as increased risk of secondary leukemia with Cytoxan were also discussed as well.     1. Secondary malignant neoplasm of axilla  2. Malignant neoplasm of central portion of left breast in female, estrogen receptor positive  Tolerating C1 of TC well so far with mild side effects  Getting growth factor on D2  She had a pet scan done in [place], says results came back negative, but she will get a copy of the scans  Will rx tramadol as needed for pain and lomotil for diarrhea  Use biotene for dry mouth  Rtc as scheduled for C2    - traMADoL (ULTRAM) 50 mg tablet; Take 1 tablet (50 mg total) by mouth every 6 (six) hours as needed for Pain.  Dispense: 28 tablet; Refill: 0    3. Cancer related pain  - traMADoL (ULTRAM) 50 mg tablet; Take 1 tablet (50 mg total) by mouth every 6 (six) hours as needed for Pain.  Dispense: 28 tablet; Refill: 0    4. Chemotherapy induced diarrhea  - increase oral fluid intake  - use imodium as needed  - requested lomotil to take if imodium does not work    - diphenoxylate-atropine 2.5-0.025 mg (LOMOTIL) 2.5-0.025 mg per tablet; Take 1 tablet by mouth 4 (four) times daily as needed for Diarrhea.  Dispense: 40 tablet; Refill: 0    Route Chart for Scheduling    Med Onc Chart Routing      Follow up with physician 3 weeks. 9 weeks   Follow up with RAMYA  6 weeks.   Infusion scheduling note   every 3 week x 3 more doses   Injection scheduling note every 3 weeks x 3 more doses   Labs CBC, CMP and B HCG   Scheduling:  Preferred lab:  Lab interval:  ecery 3 weeks   Imaging    Pharmacy appointment    Other referrals                Treatment Plan Information   OP BREAST TC (DOCEtaxel cycloPHOSphamide) Q3W Ana Shipley MD   Associated diagnosis: Malignant neoplasm of central portion of left breast in female, estrogen receptor positive Stage IA pT1c, pN1a, cM0, G2, ER+, KY+, HER2: Equivocal noted on 9/19/2024   Line of treatment: Adjuvant  Treatment Goal: Curative     Upcoming Treatment Dates - OP BREAST TC (DOCEtaxel cycloPHOSphamide) Q3W    10/25/2024       Pre-Medications       palonosetron 0.25mg/dexAMETHasone 20mg in NS IVPB 0.25 mg 50 mL       Chemotherapy       DOCEtaxel (TAXOTERE) 75 mg/m2 = 126 mg in 0.9% NaCl 256.3 mL chemo infusion       cycloPHOSphamide 600 mg/m2 = 1,000 mg in 0.9% NaCl 255 mL chemo infusion  10/26/2024       Growth Factor       pegfilgrastim-fpgk (Stimufend) injection 6 mg  11/15/2024       Pre-Medications       palonosetron 0.25mg/dexAMETHasone 20mg in NS IVPB 0.25 mg 50 mL       Chemotherapy       DOCEtaxel (TAXOTERE) 75 mg/m2 = 126 mg in 0.9% NaCl 256.3 mL chemo infusion       cycloPHOSphamide 600 mg/m2 = 1,000 mg in 0.9% NaCl 255 mL chemo infusion  11/16/2024       Growth Factor       pegfilgrastim-fpgk (Stimufend) injection 6 mg    Total time of this visit, including time spent face to face with patient and/or via video/audio, and also in preparing for today's visit for MDM and documentation. (Medical Decision Making, including consideration of possible diagnoses, management options, complex medical record review, review of diagnostic tests and information, consideration and discussion of significant complications based on comorbidities, and discussion with providers involved with the care of the patient) is 20 minutes. Greater than  50% was spent face to face with the patient counseling and coordinating care.    Regina So NP   10/08/2024      Answers submitted by the patient for this visit:  Review of Systems Questionnaire (Submitted on 10/8/2024)  appetite change : No  unexpected weight change: No  mouth sores: No  visual disturbance: No  adenopathy: No

## 2024-10-09 ENCOUNTER — TELEPHONE (OUTPATIENT)
Dept: HEMATOLOGY/ONCOLOGY | Facility: CLINIC | Age: 44
End: 2024-10-09
Payer: COMMERCIAL

## 2024-10-09 ENCOUNTER — CLINICAL SUPPORT (OUTPATIENT)
Dept: REHABILITATION | Facility: HOSPITAL | Age: 44
End: 2024-10-09
Payer: COMMERCIAL

## 2024-10-09 DIAGNOSIS — C50.112 MALIGNANT NEOPLASM OF CENTRAL PORTION OF LEFT BREAST IN FEMALE, ESTROGEN RECEPTOR POSITIVE: Primary | ICD-10-CM

## 2024-10-09 DIAGNOSIS — Z17.0 MALIGNANT NEOPLASM OF CENTRAL PORTION OF LEFT BREAST IN FEMALE, ESTROGEN RECEPTOR POSITIVE: Primary | ICD-10-CM

## 2024-10-09 DIAGNOSIS — G62.0 CHEMOTHERAPY-INDUCED NEUROPATHY: ICD-10-CM

## 2024-10-09 DIAGNOSIS — T45.1X5A CHEMOTHERAPY-INDUCED NEUROPATHY: ICD-10-CM

## 2024-10-09 PROCEDURE — 97810 ACUP 1/> WO ESTIM 1ST 15 MIN: CPT | Performed by: ACUPUNCTURIST

## 2024-10-09 PROCEDURE — 97811 ACUP 1/> W/O ESTIM EA ADD 15: CPT | Performed by: ACUPUNCTURIST

## 2024-10-09 NOTE — TELEPHONE ENCOUNTER
Spoke w/ pt in regards to scheduling nutrition referral placed by Susie Hoffman NP.  Pt approved scheduling with Yvrose Adorno RD for virtual visit on 10/28 @ 1PM.        [place] ext [phone]

## 2024-10-09 NOTE — TELEPHONE ENCOUNTER
Lvm in regards to scheduling nutrition referral placed by Susie Hoffman NP. MA instructed pt. in voicemail to call the office number for scheduling.         GLADIS AGUIRRE ext [phone]

## 2024-10-10 NOTE — PROGRESS NOTES
Acupuncture Evaluation Note     Name: Bhargavi Gonzalez  Clinic Number: [id number]    Traditional Chinese Medicine (TCM) Diagnosis: Qi Stagnation, Blood Stasis, and Damp  Medical Diagnosis:   Encounter Diagnoses   Name Primary?    Malignant neoplasm of central portion of left breast in female, estrogen receptor positive Yes    Chemotherapy-induced neuropathy         Evaluation Date: 10/10/2024    Visit #/Visits authorized: 15, 2/15    Precautions: cancer    Subjective     Chief Concern: Breast cancer of left breast, dx July 3rd. Bilateral mastectomy on 8/2 with follow up to remove 8 lymph nodes, one malignant, on 8/26. LOLY flap reconstruction to follow. CINV, CIPN preventative. Chronic low back pain and upper-mid back/shoulders, onset 5+ years ago.     Medical necessity is demonstrated by the following IMPAIRMENTS: Medical Necessity: Decreased quality of life              Aggravating Factors:  standing and prolonged sitting   Relieving Factors:  stretching, NSAIDs     Symptom Description:     Quality:  Aching  Severity:  4  Frequency:  every morning and every day    Previous Treatments Tried:  Chiropractic treatment and Acupuncture        Objective     Observation:     Tongue:  thin white coat, red tip, center line crack, scalloped peeled edges     Body:     Color:     Coating:      Pulse:  cun excess, slippery, dl thin, chi deep              New Findings:      Treatment     Treatment Principles:  Move qi and blood, transform damp, nourish qi, stop pain     Acupuncture points used:  4 TRUDY, BA JANE, BA CLAIRE , Gb34, Ki3, Ki6, Li11, Lu7, Pc6, REN12, REN6, Sp6, Sp9, St25, St36, and YIN SANCHEZ    Bilateral points:  Unilateral points:  Auricular Treatment:  knapp men    Needles In: 30  Needles Out: 30  Needles W/O STIM placed: 2:20  Needles W/O STIM removed: 2:50      Other Traditional Chinese Medicine Modalities - Cupping  Gwasha    Assessment     After treatment, patient felt low appetite, fatigue, monitor progress and  continue with care    Patient prognosis is Good.     Patient will continue to benefit from acupuncture treatment to address the deficits listed in the problem list box on initial evaluation, provide patient family education and to maximize pt's level of independence in the home and community environment.     Patient's spiritual, cultural and educational needs considered and pt agreeable to plan of care and goals.     Anticipated barriers to treatment: none    Plan     Recommend 1 /week for 4 sessions then re-assess.      Education:  Patient is aware of cumulative benefit of acupuncture

## 2024-10-23 ENCOUNTER — TELEPHONE (OUTPATIENT)
Dept: HEMATOLOGY/ONCOLOGY | Facility: CLINIC | Age: 44
End: 2024-10-23
Payer: COMMERCIAL

## 2024-10-24 ENCOUNTER — CLINICAL SUPPORT (OUTPATIENT)
Dept: REHABILITATION | Facility: HOSPITAL | Age: 44
End: 2024-10-24
Payer: COMMERCIAL

## 2024-10-24 ENCOUNTER — OFFICE VISIT (OUTPATIENT)
Dept: HEMATOLOGY/ONCOLOGY | Facility: CLINIC | Age: 44
End: 2024-10-24
Payer: COMMERCIAL

## 2024-10-24 ENCOUNTER — LAB VISIT (OUTPATIENT)
Dept: LAB | Facility: HOSPITAL | Age: 44
End: 2024-10-24
Attending: INTERNAL MEDICINE
Payer: COMMERCIAL

## 2024-10-24 VITALS
HEART RATE: 82 BPM | HEIGHT: 59 IN | TEMPERATURE: 98 F | BODY MASS INDEX: 30.18 KG/M2 | RESPIRATION RATE: 18 BRPM | OXYGEN SATURATION: 100 % | SYSTOLIC BLOOD PRESSURE: 104 MMHG | DIASTOLIC BLOOD PRESSURE: 64 MMHG | WEIGHT: 149.69 LBS

## 2024-10-24 DIAGNOSIS — C50.112 MALIGNANT NEOPLASM OF CENTRAL PORTION OF LEFT BREAST IN FEMALE, ESTROGEN RECEPTOR POSITIVE: Primary | ICD-10-CM

## 2024-10-24 DIAGNOSIS — G62.0 CHEMOTHERAPY-INDUCED NEUROPATHY: ICD-10-CM

## 2024-10-24 DIAGNOSIS — C50.112 MALIGNANT NEOPLASM OF CENTRAL PORTION OF LEFT BREAST IN FEMALE, ESTROGEN RECEPTOR POSITIVE: ICD-10-CM

## 2024-10-24 DIAGNOSIS — Z79.899 IMMUNODEFICIENCY DUE TO DRUGS: ICD-10-CM

## 2024-10-24 DIAGNOSIS — C79.89 SECONDARY MALIGNANT NEOPLASM OF AXILLA: ICD-10-CM

## 2024-10-24 DIAGNOSIS — T45.1X5A CHEMOTHERAPY-INDUCED NEUROPATHY: ICD-10-CM

## 2024-10-24 DIAGNOSIS — Z17.0 MALIGNANT NEOPLASM OF CENTRAL PORTION OF LEFT BREAST IN FEMALE, ESTROGEN RECEPTOR POSITIVE: Primary | ICD-10-CM

## 2024-10-24 DIAGNOSIS — D84.821 IMMUNODEFICIENCY DUE TO DRUGS: ICD-10-CM

## 2024-10-24 DIAGNOSIS — Z17.0 MALIGNANT NEOPLASM OF CENTRAL PORTION OF LEFT BREAST IN FEMALE, ESTROGEN RECEPTOR POSITIVE: ICD-10-CM

## 2024-10-24 LAB
ALBUMIN SERPL BCP-MCNC: 3.7 G/DL (ref 3.5–5.2)
ALP SERPL-CCNC: 77 U/L (ref 40–150)
ALT SERPL W/O P-5'-P-CCNC: 16 U/L (ref 10–44)
ANION GAP SERPL CALC-SCNC: 6 MMOL/L (ref 8–16)
AST SERPL-CCNC: 15 U/L (ref 10–40)
BASOPHILS # BLD AUTO: 0.09 K/UL (ref 0–0.2)
BASOPHILS NFR BLD: 2.1 % (ref 0–1.9)
BILIRUB SERPL-MCNC: 0.4 MG/DL (ref 0.1–1)
BUN SERPL-MCNC: 14 MG/DL (ref 6–20)
CALCIUM SERPL-MCNC: 8.6 MG/DL (ref 8.7–10.5)
CHLORIDE SERPL-SCNC: 108 MMOL/L (ref 95–110)
CO2 SERPL-SCNC: 23 MMOL/L (ref 23–29)
CREAT SERPL-MCNC: 0.6 MG/DL (ref 0.5–1.4)
DIFFERENTIAL METHOD BLD: ABNORMAL
EOSINOPHIL # BLD AUTO: 0.1 K/UL (ref 0–0.5)
EOSINOPHIL NFR BLD: 1.6 % (ref 0–8)
ERYTHROCYTE [DISTWIDTH] IN BLOOD BY AUTOMATED COUNT: 15.3 % (ref 11.5–14.5)
EST. GFR  (NO RACE VARIABLE): >60 ML/MIN/1.73 M^2
GLUCOSE SERPL-MCNC: 103 MG/DL (ref 70–110)
HCG INTACT+B SERPL-ACNC: <2.4 MIU/ML
HCT VFR BLD AUTO: 33.1 % (ref 37–48.5)
HGB BLD-MCNC: 10.1 G/DL (ref 12–16)
IMM GRANULOCYTES # BLD AUTO: 0.01 K/UL (ref 0–0.04)
IMM GRANULOCYTES NFR BLD AUTO: 0.2 % (ref 0–0.5)
LYMPHOCYTES # BLD AUTO: 1.2 K/UL (ref 1–4.8)
LYMPHOCYTES NFR BLD: 27.8 % (ref 18–48)
MCH RBC QN AUTO: 25.8 PG (ref 27–31)
MCHC RBC AUTO-ENTMCNC: 30.5 G/DL (ref 32–36)
MCV RBC AUTO: 85 FL (ref 82–98)
MONOCYTES # BLD AUTO: 0.3 K/UL (ref 0.3–1)
MONOCYTES NFR BLD: 7.9 % (ref 4–15)
NEUTROPHILS # BLD AUTO: 2.6 K/UL (ref 1.8–7.7)
NEUTROPHILS NFR BLD: 60.4 % (ref 38–73)
NRBC BLD-RTO: 0 /100 WBC
PLATELET # BLD AUTO: 367 K/UL (ref 150–450)
PMV BLD AUTO: 9.4 FL (ref 9.2–12.9)
POTASSIUM SERPL-SCNC: 4 MMOL/L (ref 3.5–5.1)
PROT SERPL-MCNC: 6.9 G/DL (ref 6–8.4)
RBC # BLD AUTO: 3.91 M/UL (ref 4–5.4)
SODIUM SERPL-SCNC: 137 MMOL/L (ref 136–145)
WBC # BLD AUTO: 4.31 K/UL (ref 3.9–12.7)

## 2024-10-24 PROCEDURE — 84702 CHORIONIC GONADOTROPIN TEST: CPT | Performed by: INTERNAL MEDICINE

## 2024-10-24 PROCEDURE — 85025 COMPLETE CBC W/AUTO DIFF WBC: CPT | Performed by: INTERNAL MEDICINE

## 2024-10-24 PROCEDURE — G2211 COMPLEX E/M VISIT ADD ON: HCPCS | Mod: S$GLB,,, | Performed by: INTERNAL MEDICINE

## 2024-10-24 PROCEDURE — 80053 COMPREHEN METABOLIC PANEL: CPT | Performed by: INTERNAL MEDICINE

## 2024-10-24 PROCEDURE — 99215 OFFICE O/P EST HI 40 MIN: CPT | Mod: S$GLB,,, | Performed by: INTERNAL MEDICINE

## 2024-10-24 PROCEDURE — 97814 ACUP 1/> W/ESTIM EA ADDL 15: CPT | Performed by: ACUPUNCTURIST

## 2024-10-24 PROCEDURE — 36415 COLL VENOUS BLD VENIPUNCTURE: CPT | Performed by: INTERNAL MEDICINE

## 2024-10-24 PROCEDURE — 99999 PR PBB SHADOW E&M-EST. PATIENT-LVL IV: CPT | Mod: PBBFAC,,, | Performed by: INTERNAL MEDICINE

## 2024-10-24 PROCEDURE — 97813 ACUP 1/> W/ESTIM 1ST 15 MIN: CPT | Performed by: ACUPUNCTURIST

## 2024-10-24 NOTE — TELEPHONE ENCOUNTER
"Called to remind pt of today's appt. Pt stated that she was in the parking lot "waiting for a parking spot".    FLORENCE Loaiza MA    "

## 2024-10-24 NOTE — PROGRESS NOTES
[facility]/ [facility]   at [facility] Note:      Chief Complaint:   Encounter Diagnoses   Name Primary?    Malignant neoplasm of central portion of left breast in female, estrogen receptor positive Yes    Secondary malignant neoplasm of axilla     Immunodeficiency due to drugs         Cancer Staging   Malignant neoplasm of central portion of left breast in female, estrogen receptor positive  Staging form: Breast, AJCC 8th Edition  - Pathologic stage from 2024: Stage IA (pT1c, pN1a, cM0, G2, ER+, OH+, HER2: Equivocal) - Signed by Aan Shipley MD on 2024      HPI:  Bhargavi Gonzalez is a 44 y.o. female who presents today for C2D1 of TC x 4. She tolerated treatment well. She had some diarrhea but lomotil helped. Tastes have improved    Oncology History  Patient self palpated a left breast mass in 2023 but had delay due to relocation  Bilateral breast ultrasound 24 showed L breast nodule at 6oc posterior to NAC     L breast bx 24: IDC, grade 2, ER >95%/ OH >95%/ HER2 1+; Ki67 5%    Bilateral mastectomy SLNBx 24: IDC, grade 2, 1.7cm maximal dimension; 1/ LN+ with EXE  L axillary dissection  LN+     Invitae 7/15/24 negative   Started TC x 4 on 10/4/24    Gyn History:   Menarche: 10  Menopause: premenopause    Age at first pregnancy: 21      Patient Active Problem List   Diagnosis    Bilateral thumb pain    Malignant neoplasm of central portion of left breast in female, estrogen receptor positive    Chemotherapy-induced nausea    Anxious mood as adjustment reaction       Current Outpatient Medications   Medication Instructions    EScitalopram oxalate (LEXAPRO) 10 mg    loratadine (CLARITIN) 10 mg, Daily    OLANZapine (ZYPREXA) 5 MG tablet Take 1 tablet by mouth nightly on days 1-3 of each chemotherapy cycle.    ondansetron (ZOFRAN) 8 mg, Oral, Every 8 hours PRN    prochlorperazine (COMPAZINE) 10 mg, Oral, Every 6 hours  "PRN    tirzepatide 5 mg, Every 7 days    traMADoL (ULTRAM) 50 mg, Oral, Every 6 hours PRN       Review of Systems:   Review of Systems   Constitutional: Negative.    HENT: Negative.     Respiratory: Negative.  Negative for cough and shortness of breath.    Cardiovascular: Negative.  Negative for chest pain.   Gastrointestinal: Negative.  Negative for abdominal pain and diarrhea.   Genitourinary:  Negative for frequency.   Musculoskeletal: Negative.  Negative for back pain.   Skin:  Negative for rash.   Neurological:  Negative for headaches.   Endo/Heme/Allergies: Negative.    Psychiatric/Behavioral:  The patient is not nervous/anxious.    All other systems reviewed and are negative.      PHYSICAL EXAM:  /64   Pulse 82   Temp 97.8 °F (36.6 °C) (Oral)   Resp 18   Ht 4' 11" (1.499 m)   Wt 67.9 kg (149 lb 11.1 oz)   SpO2 100%   BMI 30.23 kg/m²     General Appearance:    Alert, cooperative, no distress, appears stated age   Lungs:     Clear to auscultation bilaterally, respirations unlabored    Heart:    Regular rate and rhythm, S1 and S2 normal, no murmur, rub    or gallop   Breast Exam:    Deferred    Abdomen:     Soft, non-tender, bowel sounds active all four quadrants,     no masses, no organomegaly   Extremities:   Extremities normal, atraumatic, no cyanosis or edema   Pulses:   2+ and symmetric all extremities   Skin:   Skin color, texture, turgor normal, no rashes or lesions   Lymph nodes:   Cervical, supraclavicular, and axillary nodes normal   Neurologic:   CNII-XII intact, normal gait         Pertinent Labs & Imaging:  Pathology Results  (Last 30 days)      None            No results found for this or any previous visit (from the past 24 hours).    Assessment & Plan:    Patient with stage I ER+ breast cancer with 1/9 LN+  Opted for TC x 4 over   Here for cycle 2 of TC x 4  Tolerating C1 of TC well so far with mild side effects  Will review labs and if stable follow up chemotherapy       Route " Chart for Scheduling    Med Onc Chart Routing      Follow up with physician 6 weeks.   Follow up with RAMYA 3 weeks.   Infusion scheduling note    Injection scheduling note    Labs CBC, B HCG and CMP   Scheduling:  Preferred lab:  Lab interval:     Imaging    Pharmacy appointment    Other referrals                Treatment Plan Information   OP BREAST TC (DOCEtaxel cycloPHOSphamide) Q3W Ana Shipley MD   Associated diagnosis: Malignant neoplasm of central portion of left breast in female, estrogen receptor positive Stage IA pT1c, pN1a, cM0, G2, ER+, LA+, HER2: Equivocal noted on 9/19/2024   Line of treatment: Adjuvant  Treatment Goal: Curative     Upcoming Treatment Dates - OP BREAST TC (DOCEtaxel cycloPHOSphamide) Q3W    10/25/2024       Pre-Medications       palonosetron 0.25mg/dexAMETHasone 20mg in NS IVPB 0.25 mg 50 mL       Chemotherapy       DOCEtaxel (TAXOTERE) 75 mg/m2 = 126 mg in 0.9% NaCl 256.3 mL chemo infusion       cycloPHOSphamide 600 mg/m2 = 1,000 mg in 0.9% NaCl 255 mL chemo infusion  10/26/2024       Growth Factor       pegfilgrastim-fpgk (Stimufend) injection 6 mg  11/15/2024       Pre-Medications       palonosetron 0.25mg/dexAMETHasone 20mg in NS IVPB 0.25 mg 50 mL       Chemotherapy       DOCEtaxel (TAXOTERE) 75 mg/m2 = 126 mg in 0.9% NaCl 256.3 mL chemo infusion       cycloPHOSphamide 600 mg/m2 = 1,000 mg in 0.9% NaCl 255 mL chemo infusion  11/16/2024       Growth Factor       pegfilgrastim-fpgk (Stimufend) injection 6 mg    MDM includes  :    - Acute or chronic illness or injury that poses a threat to life or bodily function  - Independent review and explanation of 2 results from unique tests  - Discussion of management and ordering 2 unique tests  - Extensive discussion of treatment and management  - Prescription drug management  - Drug therapy requiring intensive monitoring for toxicity    Ana Shipley MD   10/24/2024

## 2024-10-24 NOTE — PROGRESS NOTES
Acupuncture Evaluation Note     Name: Bhargavi Gonzalez  Clinic Number: [id number]    Traditional Chinese Medicine (TCM) Diagnosis: Qi Stagnation, Blood Stasis, and Damp  Medical Diagnosis:   Encounter Diagnoses   Name Primary?    Malignant neoplasm of central portion of left breast in female, estrogen receptor positive Yes    Chemotherapy-induced neuropathy         Evaluation Date: 10/24/2024    Visit #/Visits authorized: 15, 3/15    Precautions: cancer    Subjective     Chief Concern: Breast cancer of left breast, dx July 3rd. Bilateral mastectomy on 8/2 with follow up to remove 8 lymph nodes, one malignant, on 8/26. LOLY flap reconstruction to follow. CINV, CIPN preventative. Chronic low back pain and upper-mid back/shoulders, onset 5+ years ago.     Medical necessity is demonstrated by the following IMPAIRMENTS: Medical Necessity: Decreased quality of life              Aggravating Factors:  standing and prolonged sitting   Relieving Factors:  stretching, NSAIDs     Symptom Description:     Quality:  Aching  Severity:  4  Frequency:  every morning and every day    Previous Treatments Tried:  Chiropractic treatment and Acupuncture        Objective     Observation:     Tongue:  thin white coat, red tip, center line crack, scalloped peeled edges     Body:     Color:     Coating:      Pulse:  cun excess, slippery, dl thin, chi deep              New Findings:      Treatment     Treatment Principles:  Move qi and blood, transform damp, nourish qi, stop pain     Acupuncture points used:  4 TRUDY, BA JANE, BA CLAIRE , Gb34, Ki3, Ki6, Lu7, Pc6, REN12, REN6, Sp6, Sp9, St25, St36, and YIN SANCHEZ    Bilateral points:  Unilateral points:  Auricular Treatment:  knapp men    Needles In: 30  Needles Out: 30  Needles W/ STIM placed: 2:20  Needles W/ STIM removed: 2:50      Other Traditional Chinese Medicine Modalities - Cupping  Gwasha    Assessment     After treatment, patient felt hot flashes, fatigue, anxiety, some GI upset but  passed quickly     Patient prognosis is Good.     Patient will continue to benefit from acupuncture treatment to address the deficits listed in the problem list box on initial evaluation, provide patient family education and to maximize pt's level of independence in the home and community environment.     Patient's spiritual, cultural and educational needs considered and pt agreeable to plan of care and goals.     Anticipated barriers to treatment: none    Plan     Recommend 1 /week for 3 sessions then re-assess.      Education:  Patient is aware of cumulative benefit of acupuncture

## 2024-10-25 ENCOUNTER — INFUSION (OUTPATIENT)
Dept: INFUSION THERAPY | Facility: HOSPITAL | Age: 44
End: 2024-10-25
Payer: COMMERCIAL

## 2024-10-25 VITALS
RESPIRATION RATE: 18 BRPM | SYSTOLIC BLOOD PRESSURE: 123 MMHG | HEART RATE: 64 BPM | TEMPERATURE: 98 F | HEIGHT: 59 IN | BODY MASS INDEX: 30.18 KG/M2 | WEIGHT: 149.69 LBS | DIASTOLIC BLOOD PRESSURE: 74 MMHG

## 2024-10-25 DIAGNOSIS — C50.112 MALIGNANT NEOPLASM OF CENTRAL PORTION OF LEFT BREAST IN FEMALE, ESTROGEN RECEPTOR POSITIVE: ICD-10-CM

## 2024-10-25 DIAGNOSIS — C50.112 MALIGNANT NEOPLASM OF CENTRAL PORTION OF LEFT BREAST IN FEMALE, ESTROGEN RECEPTOR POSITIVE: Primary | ICD-10-CM

## 2024-10-25 DIAGNOSIS — Z17.0 MALIGNANT NEOPLASM OF CENTRAL PORTION OF LEFT BREAST IN FEMALE, ESTROGEN RECEPTOR POSITIVE: ICD-10-CM

## 2024-10-25 DIAGNOSIS — Z17.0 MALIGNANT NEOPLASM OF CENTRAL PORTION OF LEFT BREAST IN FEMALE, ESTROGEN RECEPTOR POSITIVE: Primary | ICD-10-CM

## 2024-10-25 PROCEDURE — 96375 TX/PRO/DX INJ NEW DRUG ADDON: CPT

## 2024-10-25 PROCEDURE — 25000003 PHARM REV CODE 250: Performed by: INTERNAL MEDICINE

## 2024-10-25 PROCEDURE — 96417 CHEMO IV INFUS EACH ADDL SEQ: CPT

## 2024-10-25 PROCEDURE — 0663T HC SCALP COOLING PLACEMENT MNTR & REMOVAL OF DEVICE: CPT

## 2024-10-25 PROCEDURE — 96367 TX/PROPH/DG ADDL SEQ IV INF: CPT

## 2024-10-25 PROCEDURE — 63600175 PHARM REV CODE 636 W HCPCS: Performed by: INTERNAL MEDICINE

## 2024-10-25 PROCEDURE — 96413 CHEMO IV INFUSION 1 HR: CPT

## 2024-10-25 RX ORDER — DIPHENHYDRAMINE HYDROCHLORIDE 50 MG/ML
50 INJECTION INTRAMUSCULAR; INTRAVENOUS ONCE AS NEEDED
Status: DISCONTINUED | OUTPATIENT
Start: 2024-10-25 | End: 2024-10-25 | Stop reason: HOSPADM

## 2024-10-25 RX ORDER — PROCHLORPERAZINE EDISYLATE 5 MG/ML
10 INJECTION INTRAMUSCULAR; INTRAVENOUS ONCE AS NEEDED
Status: COMPLETED | OUTPATIENT
Start: 2024-10-25 | End: 2024-10-25

## 2024-10-25 RX ORDER — EPINEPHRINE 0.3 MG/.3ML
0.3 INJECTION SUBCUTANEOUS ONCE AS NEEDED
Status: DISCONTINUED | OUTPATIENT
Start: 2024-10-25 | End: 2024-10-25 | Stop reason: HOSPADM

## 2024-10-25 RX ORDER — DIPHENHYDRAMINE HYDROCHLORIDE 50 MG/ML
50 INJECTION INTRAMUSCULAR; INTRAVENOUS ONCE AS NEEDED
Status: CANCELLED | OUTPATIENT
Start: 2024-10-25

## 2024-10-25 RX ORDER — EPINEPHRINE 0.3 MG/.3ML
0.3 INJECTION SUBCUTANEOUS ONCE AS NEEDED
Status: CANCELLED | OUTPATIENT
Start: 2024-10-25

## 2024-10-25 RX ORDER — SODIUM CHLORIDE 0.9 % (FLUSH) 0.9 %
10 SYRINGE (ML) INJECTION
Status: CANCELLED | OUTPATIENT
Start: 2024-10-25

## 2024-10-25 RX ORDER — HEPARIN 100 UNIT/ML
500 SYRINGE INTRAVENOUS
Status: DISCONTINUED | OUTPATIENT
Start: 2024-10-25 | End: 2024-10-25 | Stop reason: HOSPADM

## 2024-10-25 RX ORDER — HEPARIN 100 UNIT/ML
500 SYRINGE INTRAVENOUS
Status: CANCELLED | OUTPATIENT
Start: 2024-10-25

## 2024-10-25 RX ORDER — OLANZAPINE 5 MG/1
TABLET ORAL
Qty: 3 TABLET | Refills: 5 | Status: SHIPPED | OUTPATIENT
Start: 2024-10-25

## 2024-10-25 RX ORDER — PROCHLORPERAZINE EDISYLATE 5 MG/ML
10 INJECTION INTRAMUSCULAR; INTRAVENOUS ONCE AS NEEDED
Status: CANCELLED | OUTPATIENT
Start: 2024-10-25

## 2024-10-25 RX ORDER — SODIUM CHLORIDE 0.9 % (FLUSH) 0.9 %
10 SYRINGE (ML) INJECTION
Status: DISCONTINUED | OUTPATIENT
Start: 2024-10-25 | End: 2024-10-25 | Stop reason: HOSPADM

## 2024-10-25 RX ADMIN — DEXAMETHASONE SODIUM PHOSPHATE 0.25 MG: 4 INJECTION, SOLUTION INTRA-ARTICULAR; INTRALESIONAL; INTRAMUSCULAR; INTRAVENOUS; SOFT TISSUE at 09:10

## 2024-10-25 RX ADMIN — CYCLOPHOSPHAMIDE 1000 MG: 200 INJECTION, SOLUTION INTRAVENOUS at 11:10

## 2024-10-25 RX ADMIN — SODIUM CHLORIDE: 9 INJECTION, SOLUTION INTRAVENOUS at 09:10

## 2024-10-25 RX ADMIN — DOCETAXEL 120 MG: 10 INJECTION, SOLUTION INTRAVENOUS at 10:10

## 2024-10-25 RX ADMIN — PROCHLORPERAZINE EDISYLATE 10 MG: 5 INJECTION INTRAMUSCULAR; INTRAVENOUS at 10:10

## 2024-10-25 NOTE — PLAN OF CARE
1245 pt tolerated tx without issue. Pt did not tolerate Cool Cap Therapy. Keep cool cap on for taxol and then wanted to continue cool therapy. Cool cap therapy stopped after taxol. Pt was much more comfortable and completed tx. Pt d/c from clinic with mother.

## 2024-10-26 ENCOUNTER — INFUSION (OUTPATIENT)
Dept: INFUSION THERAPY | Facility: HOSPITAL | Age: 44
End: 2024-10-26
Payer: COMMERCIAL

## 2024-10-26 VITALS — WEIGHT: 149.69 LBS | HEIGHT: 59 IN | BODY MASS INDEX: 30.18 KG/M2

## 2024-10-26 DIAGNOSIS — C50.112 MALIGNANT NEOPLASM OF CENTRAL PORTION OF LEFT BREAST IN FEMALE, ESTROGEN RECEPTOR POSITIVE: Primary | ICD-10-CM

## 2024-10-26 DIAGNOSIS — Z17.0 MALIGNANT NEOPLASM OF CENTRAL PORTION OF LEFT BREAST IN FEMALE, ESTROGEN RECEPTOR POSITIVE: Primary | ICD-10-CM

## 2024-10-26 PROCEDURE — 96372 THER/PROPH/DIAG INJ SC/IM: CPT

## 2024-10-26 PROCEDURE — 63600175 PHARM REV CODE 636 W HCPCS: Mod: JZ,JG | Performed by: INTERNAL MEDICINE

## 2024-10-26 RX ADMIN — PEGFLILGRASTIM-FPGK 6 MG: 6 INJECTION, SOLUTION SUBCUTANEOUS at 12:10

## 2024-10-28 ENCOUNTER — PATIENT MESSAGE (OUTPATIENT)
Dept: HEMATOLOGY/ONCOLOGY | Facility: CLINIC | Age: 44
End: 2024-10-28

## 2024-10-28 ENCOUNTER — CLINICAL SUPPORT (OUTPATIENT)
Dept: HEMATOLOGY/ONCOLOGY | Facility: CLINIC | Age: 44
End: 2024-10-28
Payer: COMMERCIAL

## 2024-10-28 VITALS — HEIGHT: 59 IN | WEIGHT: 147 LBS | BODY MASS INDEX: 29.64 KG/M2

## 2024-10-28 DIAGNOSIS — Z71.3 NUTRITIONAL COUNSELING: Primary | ICD-10-CM

## 2024-10-28 DIAGNOSIS — E66.3 OVERWEIGHT (BMI 25.0-29.9): ICD-10-CM

## 2024-10-28 DIAGNOSIS — Z17.0 MALIGNANT NEOPLASM OF CENTRAL PORTION OF LEFT BREAST IN FEMALE, ESTROGEN RECEPTOR POSITIVE: ICD-10-CM

## 2024-10-28 DIAGNOSIS — C50.112 MALIGNANT NEOPLASM OF CENTRAL PORTION OF LEFT BREAST IN FEMALE, ESTROGEN RECEPTOR POSITIVE: ICD-10-CM

## 2024-10-28 DIAGNOSIS — F43.22 ANXIOUS MOOD AS ADJUSTMENT REACTION: ICD-10-CM

## 2024-10-28 PROCEDURE — 97802 MEDICAL NUTRITION INDIV IN: CPT | Mod: 95,,, | Performed by: DIETITIAN, REGISTERED

## 2024-11-07 ENCOUNTER — CLINICAL SUPPORT (OUTPATIENT)
Dept: REHABILITATION | Facility: HOSPITAL | Age: 44
End: 2024-11-07
Payer: COMMERCIAL

## 2024-11-07 DIAGNOSIS — Z17.0 MALIGNANT NEOPLASM OF CENTRAL PORTION OF LEFT BREAST IN FEMALE, ESTROGEN RECEPTOR POSITIVE: Primary | ICD-10-CM

## 2024-11-07 DIAGNOSIS — G62.0 CHEMOTHERAPY-INDUCED NEUROPATHY: ICD-10-CM

## 2024-11-07 DIAGNOSIS — C50.112 MALIGNANT NEOPLASM OF CENTRAL PORTION OF LEFT BREAST IN FEMALE, ESTROGEN RECEPTOR POSITIVE: Primary | ICD-10-CM

## 2024-11-07 DIAGNOSIS — T45.1X5A CHEMOTHERAPY-INDUCED NEUROPATHY: ICD-10-CM

## 2024-11-07 PROCEDURE — 97813 ACUP 1/> W/ESTIM 1ST 15 MIN: CPT | Performed by: ACUPUNCTURIST

## 2024-11-07 PROCEDURE — 97814 ACUP 1/> W/ESTIM EA ADDL 15: CPT | Performed by: ACUPUNCTURIST

## 2024-11-07 NOTE — PROGRESS NOTES
Acupuncture Evaluation Note     Name: Bhargavi Gonzalez  Clinic Number: [id number]    Traditional Chinese Medicine (TCM) Diagnosis: Qi Stagnation, Blood Stasis, and Damp  Medical Diagnosis:   Encounter Diagnoses   Name Primary?    Malignant neoplasm of central portion of left breast in female, estrogen receptor positive Yes    Chemotherapy-induced neuropathy         Evaluation Date: 11/7/2024    Visit #/Visits authorized: 15, 4/15    Precautions: cancer    Subjective     Chief Concern: Breast cancer of left breast, dx July 3rd. Bilateral mastectomy on 8/2 with follow up to remove 8 lymph nodes, one malignant, on 8/26. LOLY flap reconstruction to follow. CINV, CIPN preventative. Chronic low back pain and upper-mid back/shoulders, onset 5+ years ago.     Medical necessity is demonstrated by the following IMPAIRMENTS: Medical Necessity: Decreased quality of life              Aggravating Factors:  standing and prolonged sitting   Relieving Factors:  stretching, NSAIDs     Symptom Description:     Quality:  Aching  Severity:  4  Frequency:  every morning and every day    Previous Treatments Tried:  Chiropractic treatment and Acupuncture        Objective     Observation:     Tongue:  thin white coat, red tip, center line crack, scalloped peeled edges     Body:     Color:     Coating:      Pulse:  cun excess, slippery, dl thin, chi deep              New Findings:      Treatment     Treatment Principles:  Move qi and blood, transform damp, nourish qi, stop pain     Acupuncture points used:  4 YATES, Gb21, Gb34, Kd10, Ki3, Li11, Pc6, Sp6, Sp9, SSC, and St36    Bilateral points:  Unilateral points:  Auricular Treatment:  knapp men    Needles In: 30  Needles Out: 30  Merritt W/ STIM placed: 2:50  Needles W/ STIM removed: 3:20      Other Traditional Chinese Medicine Modalities - Cupping  Gwasha    Assessment     After treatment, patient felt hot flashes, fatigue    Patient prognosis is Good.     Patient will continue to benefit  from acupuncture treatment to address the deficits listed in the problem list box on initial evaluation, provide patient family education and to maximize pt's level of independence in the home and community environment.     Patient's spiritual, cultural and educational needs considered and pt agreeable to plan of care and goals.     Anticipated barriers to treatment: none    Plan     Recommend 1 /week for 2 sessions then re-assess.      Education:  Patient is aware of cumulative benefit of acupuncture

## 2024-11-13 ENCOUNTER — OFFICE VISIT (OUTPATIENT)
Dept: HEMATOLOGY/ONCOLOGY | Facility: CLINIC | Age: 44
End: 2024-11-13
Payer: COMMERCIAL

## 2024-11-13 ENCOUNTER — LAB VISIT (OUTPATIENT)
Dept: LAB | Facility: HOSPITAL | Age: 44
End: 2024-11-13
Attending: INTERNAL MEDICINE
Payer: COMMERCIAL

## 2024-11-13 VITALS
SYSTOLIC BLOOD PRESSURE: 116 MMHG | WEIGHT: 147.5 LBS | HEART RATE: 81 BPM | TEMPERATURE: 98 F | HEIGHT: 59 IN | BODY MASS INDEX: 29.73 KG/M2 | DIASTOLIC BLOOD PRESSURE: 66 MMHG | RESPIRATION RATE: 17 BRPM | OXYGEN SATURATION: 100 %

## 2024-11-13 DIAGNOSIS — C50.112 MALIGNANT NEOPLASM OF CENTRAL PORTION OF LEFT BREAST IN FEMALE, ESTROGEN RECEPTOR POSITIVE: ICD-10-CM

## 2024-11-13 DIAGNOSIS — C79.89 SECONDARY MALIGNANT NEOPLASM OF AXILLA: ICD-10-CM

## 2024-11-13 DIAGNOSIS — K52.1 CHEMOTHERAPY INDUCED DIARRHEA: ICD-10-CM

## 2024-11-13 DIAGNOSIS — Z17.0 MALIGNANT NEOPLASM OF CENTRAL PORTION OF LEFT BREAST IN FEMALE, ESTROGEN RECEPTOR POSITIVE: Primary | ICD-10-CM

## 2024-11-13 DIAGNOSIS — D50.9 MICROCYTIC ANEMIA: ICD-10-CM

## 2024-11-13 DIAGNOSIS — C50.112 MALIGNANT NEOPLASM OF CENTRAL PORTION OF LEFT BREAST IN FEMALE, ESTROGEN RECEPTOR POSITIVE: Primary | ICD-10-CM

## 2024-11-13 DIAGNOSIS — T45.1X5A CHEMOTHERAPY INDUCED DIARRHEA: ICD-10-CM

## 2024-11-13 DIAGNOSIS — Z17.0 MALIGNANT NEOPLASM OF CENTRAL PORTION OF LEFT BREAST IN FEMALE, ESTROGEN RECEPTOR POSITIVE: ICD-10-CM

## 2024-11-13 DIAGNOSIS — G89.3 CANCER RELATED PAIN: ICD-10-CM

## 2024-11-13 LAB
ALBUMIN SERPL BCP-MCNC: 3.8 G/DL (ref 3.5–5.2)
ALP SERPL-CCNC: 80 U/L (ref 40–150)
ALT SERPL W/O P-5'-P-CCNC: 17 U/L (ref 10–44)
ANION GAP SERPL CALC-SCNC: 6 MMOL/L (ref 8–16)
AST SERPL-CCNC: 14 U/L (ref 10–40)
BASOPHILS # BLD AUTO: 0.08 K/UL (ref 0–0.2)
BASOPHILS NFR BLD: 1.2 % (ref 0–1.9)
BILIRUB SERPL-MCNC: 0.3 MG/DL (ref 0.1–1)
BUN SERPL-MCNC: 14 MG/DL (ref 6–20)
CALCIUM SERPL-MCNC: 9.1 MG/DL (ref 8.7–10.5)
CHLORIDE SERPL-SCNC: 107 MMOL/L (ref 95–110)
CO2 SERPL-SCNC: 25 MMOL/L (ref 23–29)
CREAT SERPL-MCNC: 0.6 MG/DL (ref 0.5–1.4)
DIFFERENTIAL METHOD BLD: ABNORMAL
EOSINOPHIL # BLD AUTO: 0 K/UL (ref 0–0.5)
EOSINOPHIL NFR BLD: 0.6 % (ref 0–8)
ERYTHROCYTE [DISTWIDTH] IN BLOOD BY AUTOMATED COUNT: 16.6 % (ref 11.5–14.5)
EST. GFR  (NO RACE VARIABLE): >60 ML/MIN/1.73 M^2
FERRITIN SERPL-MCNC: 64 NG/ML (ref 20–300)
GLUCOSE SERPL-MCNC: 86 MG/DL (ref 70–110)
HCG INTACT+B SERPL-ACNC: <2.4 MIU/ML
HCT VFR BLD AUTO: 33.1 % (ref 37–48.5)
HGB BLD-MCNC: 10.4 G/DL (ref 12–16)
IMM GRANULOCYTES # BLD AUTO: 0.02 K/UL (ref 0–0.04)
IMM GRANULOCYTES NFR BLD AUTO: 0.3 % (ref 0–0.5)
IRON SERPL-MCNC: 23 UG/DL (ref 30–160)
LYMPHOCYTES # BLD AUTO: 1.3 K/UL (ref 1–4.8)
LYMPHOCYTES NFR BLD: 19.3 % (ref 18–48)
MCH RBC QN AUTO: 26.2 PG (ref 27–31)
MCHC RBC AUTO-ENTMCNC: 31.4 G/DL (ref 32–36)
MCV RBC AUTO: 83 FL (ref 82–98)
MONOCYTES # BLD AUTO: 0.7 K/UL (ref 0.3–1)
MONOCYTES NFR BLD: 9.8 % (ref 4–15)
NEUTROPHILS # BLD AUTO: 4.7 K/UL (ref 1.8–7.7)
NEUTROPHILS NFR BLD: 68.8 % (ref 38–73)
NRBC BLD-RTO: 0 /100 WBC
PLATELET # BLD AUTO: 296 K/UL (ref 150–450)
PMV BLD AUTO: 10 FL (ref 9.2–12.9)
POTASSIUM SERPL-SCNC: 4.1 MMOL/L (ref 3.5–5.1)
PROT SERPL-MCNC: 6.8 G/DL (ref 6–8.4)
RBC # BLD AUTO: 3.97 M/UL (ref 4–5.4)
SATURATED IRON: 6 % (ref 20–50)
SODIUM SERPL-SCNC: 138 MMOL/L (ref 136–145)
TOTAL IRON BINDING CAPACITY: 400 UG/DL (ref 250–450)
TRANSFERRIN SERPL-MCNC: 270 MG/DL (ref 200–375)
WBC # BLD AUTO: 6.75 K/UL (ref 3.9–12.7)

## 2024-11-13 PROCEDURE — 85025 COMPLETE CBC W/AUTO DIFF WBC: CPT | Performed by: INTERNAL MEDICINE

## 2024-11-13 PROCEDURE — 83540 ASSAY OF IRON: CPT | Performed by: INTERNAL MEDICINE

## 2024-11-13 PROCEDURE — 99215 OFFICE O/P EST HI 40 MIN: CPT | Mod: S$GLB,,, | Performed by: NURSE PRACTITIONER

## 2024-11-13 PROCEDURE — 99999 PR PBB SHADOW E&M-EST. PATIENT-LVL IV: CPT | Mod: PBBFAC,,, | Performed by: NURSE PRACTITIONER

## 2024-11-13 PROCEDURE — 80053 COMPREHEN METABOLIC PANEL: CPT | Performed by: INTERNAL MEDICINE

## 2024-11-13 PROCEDURE — 84702 CHORIONIC GONADOTROPIN TEST: CPT | Performed by: INTERNAL MEDICINE

## 2024-11-13 PROCEDURE — 82728 ASSAY OF FERRITIN: CPT | Performed by: INTERNAL MEDICINE

## 2024-11-13 PROCEDURE — G2211 COMPLEX E/M VISIT ADD ON: HCPCS | Mod: S$GLB,,, | Performed by: NURSE PRACTITIONER

## 2024-11-13 PROCEDURE — 36415 COLL VENOUS BLD VENIPUNCTURE: CPT | Performed by: INTERNAL MEDICINE

## 2024-11-13 RX ORDER — DIPHENHYDRAMINE HYDROCHLORIDE 50 MG/ML
50 INJECTION INTRAMUSCULAR; INTRAVENOUS ONCE AS NEEDED
Status: CANCELLED | OUTPATIENT
Start: 2024-11-15

## 2024-11-13 RX ORDER — SODIUM CHLORIDE 0.9 % (FLUSH) 0.9 %
10 SYRINGE (ML) INJECTION
Status: CANCELLED | OUTPATIENT
Start: 2024-11-15

## 2024-11-13 RX ORDER — EPINEPHRINE 0.3 MG/.3ML
0.3 INJECTION SUBCUTANEOUS ONCE AS NEEDED
Status: CANCELLED | OUTPATIENT
Start: 2024-11-15

## 2024-11-13 RX ORDER — HEPARIN 100 UNIT/ML
500 SYRINGE INTRAVENOUS
Status: CANCELLED | OUTPATIENT
Start: 2024-11-15

## 2024-11-13 RX ORDER — PROCHLORPERAZINE EDISYLATE 5 MG/ML
10 INJECTION INTRAMUSCULAR; INTRAVENOUS ONCE AS NEEDED
Status: CANCELLED | OUTPATIENT
Start: 2024-11-15

## 2024-11-13 NOTE — PROGRESS NOTES
[facility]/ [facility]   at [facility] Note:      Chief Complaint:   Encounter Diagnoses   Name Primary?    Malignant neoplasm of central portion of left breast in female, estrogen receptor positive Yes    Secondary malignant neoplasm of axilla     Microcytic anemia     Chemotherapy induced diarrhea     Cancer related pain         Cancer Staging   Malignant neoplasm of central portion of left breast in female, estrogen receptor positive  Staging form: Breast, AJCC 8th Edition  - Pathologic stage from 2024: Stage IA (pT1c, pN1a, cM0, G2, ER+, GA+, HER2: Equivocal) - Signed by nAa Shipley MD on 2024      HPI:  Bhargavi Gonzalez is a 44 y.o. female who presents today for C3D1 of TC x 4. She is tolerating treatment overall well.  Thought C2 was easier than C1.      Eating and drinking well  No n/v  Diarrhea improved with last cycle  Taking tylenol/motrin and occasional tylenol for body aches  No fever  Able to do adls, mild fatigue  No neuropathy, doing acupuncture  Taking ginseng and drinking electrolytes with water  Her hair is starting to fall out more, could not tolerate the cold cap the entire time last treatment    Per Dr. Shipley's note:   Oncology History  Patient self palpated a left breast mass in 2023 but had delay due to relocation  Bilateral breast ultrasound 24 showed L breast nodule at 6oc posterior to NAC     L breast bx 24: IDC, grade 2, ER >95%/ GA >95%/ HER2 1+; Ki67 5%    Bilateral mastectomy SLNBx 24: IDC, grade 2, 1.7cm maximal dimension; 1/ LN+ with EXE  L axillary dissection 0 LN+     Invitae 7/15/24 negative   Started TC x 4 on 10/4/24    Gyn History:   Menarche: 10  Menopause: premenopause    Age at first pregnancy: 21    Patient Active Problem List   Diagnosis    Bilateral thumb pain    Malignant neoplasm of central portion of left breast in female, estrogen receptor positive     "Chemotherapy-induced nausea    Anxious mood as adjustment reaction       Current Outpatient Medications   Medication Instructions    EScitalopram oxalate (LEXAPRO) 10 mg    loratadine (CLARITIN) 10 mg, Daily    OLANZapine (ZYPREXA) 5 MG tablet Take 1 tablet by mouth nightly on days 1-3 of each chemotherapy cycle.    ondansetron (ZOFRAN) 8 mg, Oral, Every 8 hours PRN    prochlorperazine (COMPAZINE) 10 mg, Oral, Every 6 hours PRN    tirzepatide 5 mg, Every 7 days    traMADoL (ULTRAM) 50 mg, Oral, Every 6 hours PRN       Review of Systems:   Review of Systems   Constitutional: Negative.    HENT: Negative.     Respiratory: Negative.  Negative for cough and shortness of breath.    Cardiovascular: Negative.  Negative for chest pain.   Gastrointestinal: Negative.  Negative for abdominal pain and diarrhea.   Genitourinary:  Negative for frequency.   Musculoskeletal: Negative.  Negative for back pain.        Generalized aches   Skin:  Negative for rash.   Neurological:  Negative for headaches.   Endo/Heme/Allergies: Negative.    Psychiatric/Behavioral:  The patient is not nervous/anxious.    All other systems reviewed and are negative.      PHYSICAL EXAM:  /66   Pulse 81   Temp 98 °F (36.7 °C) (Oral)   Resp 17   Ht 4' 11" (1.499 m)   Wt 66.9 kg (147 lb 7.8 oz)   LMP 10/01/2024   SpO2 100%   BMI 29.79 kg/m²     General Appearance:    Alert, cooperative, no distress, appears stated age   Lungs:     Clear to auscultation bilaterally, respirations unlabored    Heart:    Regular rate and rhythm, S1 and S2 normal, no murmur, rub    or gallop   Breast Exam:    S/p bilateral mastectomy with expanders in.  No new masses,skin changes or adenopathy noted   Abdomen:     Soft, non-tender, bowel sounds active all four quadrants,     no masses, no organomegaly   Extremities:   Extremities normal, atraumatic, no cyanosis or edema   Pulses:   2+ and symmetric all extremities   Skin:   Skin color, texture, turgor normal, no " rashes or lesions   Lymph nodes:   Cervical, supraclavicular, and axillary nodes normal   Neurologic:   CNII-XII intact, normal gait     Pertinent Labs & Imaging:  Pathology Results  (Last 30 days)      None            Recent Results (from the past 24 hours)   CBC W/ AUTO DIFFERENTIAL    Collection Time: 11/13/24 12:52 PM   Result Value Ref Range    WBC 6.75 3.90 - 12.70 K/uL    RBC 3.97 (L) 4.00 - 5.40 M/uL    Hemoglobin 10.4 (L) 12.0 - 16.0 g/dL    Hematocrit 33.1 (L) 37.0 - 48.5 %    MCV 83 82 - 98 fL    MCH 26.2 (L) 27.0 - 31.0 pg    MCHC 31.4 (L) 32.0 - 36.0 g/dL    RDW 16.6 (H) 11.5 - 14.5 %    Platelets 296 150 - 450 K/uL    MPV 10.0 9.2 - 12.9 fL    Immature Granulocytes 0.3 0.0 - 0.5 %    Gran # (ANC) 4.7 1.8 - 7.7 K/uL    Immature Grans (Abs) 0.02 0.00 - 0.04 K/uL    Lymph # 1.3 1.0 - 4.8 K/uL    Mono # 0.7 0.3 - 1.0 K/uL    Eos # 0.0 0.0 - 0.5 K/uL    Baso # 0.08 0.00 - 0.20 K/uL    nRBC 0 0 /100 WBC    Gran % 68.8 38.0 - 73.0 %    Lymph % 19.3 18.0 - 48.0 %    Mono % 9.8 4.0 - 15.0 %    Eosinophil % 0.6 0.0 - 8.0 %    Basophil % 1.2 0.0 - 1.9 %    Differential Method Automated    CMP    Collection Time: 11/13/24 12:52 PM   Result Value Ref Range    Sodium 138 136 - 145 mmol/L    Potassium 4.1 3.5 - 5.1 mmol/L    Chloride 107 95 - 110 mmol/L    CO2 25 23 - 29 mmol/L    Glucose 86 70 - 110 mg/dL    BUN 14 6 - 20 mg/dL    Creatinine 0.6 0.5 - 1.4 mg/dL    Calcium 9.1 8.7 - 10.5 mg/dL    Total Protein 6.8 6.0 - 8.4 g/dL    Albumin 3.8 3.5 - 5.2 g/dL    Total Bilirubin 0.3 0.1 - 1.0 mg/dL    Alkaline Phosphatase 80 40 - 150 U/L    AST 14 10 - 40 U/L    ALT 17 10 - 44 U/L    eGFR >60.0 >60 mL/min/1.73 m^2    Anion Gap 6 (L) 8 - 16 mmol/L   B-HCG    Collection Time: 11/13/24 12:52 PM   Result Value Ref Range    HCG Quant <2.4 See Text mIU/mL       Assessment & Plan:    Bhargavi was seen today for malignant neoplasm of central portion of left breast in fem.    Diagnoses and all orders for this  visit:    Malignant neoplasm of central portion of left breast in female, estrogen receptor positive  Secondary malignant neoplasm of axilla  Patient with stage I ER+ breast cancer with 1/9 LN+  Opted for TC x 4 over   Here for cycle 3 of TC x 4  Tolerating C1 and C2 of TC well so far with mild side effects  Labs reviewed and acceptable for C3 on Friday  Would like to meet with rad/onc sooner than later to discuss possible radiation  Follow up in 3 weeks with DR. Shipley    Microcytic anemia  Mild anemia, mch low and mc on lower side of normal  Pt asking bout iron studies  Will add on to today's labs, lab called    Chemotherapy induced diarrhea  Improved  Continue imodium and lomotil as needed    Cancer related pain  Ok to take tylenol/motrin as needed  Tramadol for breakthrough pain    Route Chart for Scheduling    Med Onc Chart Routing      Follow up with physician 3 weeks.   Follow up with RAMYA    Infusion scheduling note   last treatment in 3 weeks   Injection scheduling note last injection in 3 weeks   Labs CBC, CMP and B HCG   Scheduling:  Preferred lab:  Lab interval:  in 3 weeks   Imaging    Pharmacy appointment    Other referrals       Additional referrals needed  rad/onc             Treatment Plan Information   OP BREAST TC (DOCEtaxel cycloPHOSphamide) Q3W Ana Shipley MD   Associated diagnosis: Malignant neoplasm of central portion of left breast in female, estrogen receptor positive Stage IA pT1c, pN1a, cM0, G2, ER+, KY+, HER2: Equivocal noted on 9/19/2024   Line of treatment: Adjuvant  Treatment Goal: Curative     Upcoming Treatment Dates - OP BREAST TC (DOCEtaxel cycloPHOSphamide) Q3W    11/15/2024       Pre-Medications       palonosetron 0.25mg/dexAMETHasone 20mg in NS IVPB 0.25 mg 50 mL       Chemotherapy       DOCEtaxel (TAXOTERE) 75 mg/m2 = 126 mg in 0.9% NaCl 256.3 mL chemo infusion       cycloPHOSphamide 600 mg/m2 = 1,000 mg in 0.9% NaCl 255 mL chemo infusion  11/16/2024       Growth  Factor       pegfilgrastim-fpgk (Stimufend) injection 6 mg  12/6/2024       Pre-Medications       palonosetron 0.25mg/dexAMETHasone 20mg in NS IVPB 0.25 mg 50 mL       Chemotherapy       DOCEtaxel (TAXOTERE) 75 mg/m2 = 126 mg in 0.9% NaCl 256.3 mL chemo infusion       cycloPHOSphamide 600 mg/m2 = 1,000 mg in 0.9% NaCl 255 mL chemo infusion  12/7/2024       Growth Factor       pegfilgrastim-fpgk (Stimufend) injection 6 mg    MDM includes  :    - Acute or chronic illness or injury that poses a threat to life or bodily function  - Independent review and explanation of 2 results from unique tests  - Discussion of management and ordering 2 unique tests  - Extensive discussion of treatment and management  - Prescription drug management  - Drug therapy requiring intensive monitoring for toxicity    Regina So, NP   11/13/2024

## 2024-11-13 NOTE — Clinical Note
Can we get this patient set up with Dr. Dow to discuss radiation.  She will be finishing up her chemo in 3 weeks and is eager to discuss possible radiation.  Thank you.  Regina Pt agitated and restless, pulled out NG tube, pulling at lines, confused and attempting to get OOB.  Pt unable to be redirected at this time restraints remain in place for pt safety will continue to monitor

## 2024-11-13 NOTE — Clinical Note
Good afternoon!  Last infusion patient was unable to tolerate the cold cap for the full amount of time.  She would like to try again wit her infusion on Friday.  She said the company reports there is a way to adjust the settings so it is a little warmer?  Are you able to do this?  Thanks.  Regina

## 2024-11-14 ENCOUNTER — CLINICAL SUPPORT (OUTPATIENT)
Dept: REHABILITATION | Facility: HOSPITAL | Age: 44
End: 2024-11-14
Payer: COMMERCIAL

## 2024-11-14 ENCOUNTER — PATIENT MESSAGE (OUTPATIENT)
Dept: HEMATOLOGY/ONCOLOGY | Facility: CLINIC | Age: 44
End: 2024-11-14
Payer: COMMERCIAL

## 2024-11-14 DIAGNOSIS — C50.112 MALIGNANT NEOPLASM OF CENTRAL PORTION OF LEFT BREAST IN FEMALE, ESTROGEN RECEPTOR POSITIVE: Primary | ICD-10-CM

## 2024-11-14 DIAGNOSIS — G62.0 CHEMOTHERAPY-INDUCED NEUROPATHY: ICD-10-CM

## 2024-11-14 DIAGNOSIS — Z17.0 MALIGNANT NEOPLASM OF CENTRAL PORTION OF LEFT BREAST IN FEMALE, ESTROGEN RECEPTOR POSITIVE: Primary | ICD-10-CM

## 2024-11-14 DIAGNOSIS — T45.1X5A CHEMOTHERAPY-INDUCED NEUROPATHY: ICD-10-CM

## 2024-11-14 PROCEDURE — 97810 ACUP 1/> WO ESTIM 1ST 15 MIN: CPT | Performed by: ACUPUNCTURIST

## 2024-11-14 PROCEDURE — 97811 ACUP 1/> W/O ESTIM EA ADD 15: CPT | Performed by: ACUPUNCTURIST

## 2024-11-14 NOTE — PROGRESS NOTES
Acupuncture Evaluation Note     Name: Bhargavi Gonzalez  Clinic Number: [id number]    Traditional Chinese Medicine (TCM) Diagnosis: Qi Stagnation, Blood Stasis, and Damp  Medical Diagnosis:   Encounter Diagnoses   Name Primary?    Malignant neoplasm of central portion of left breast in female, estrogen receptor positive Yes    Chemotherapy-induced neuropathy         Evaluation Date: 11/14/2024    Visit #/Visits authorized: 15, 5/15    Precautions: cancer    Subjective     Chief Concern: Breast cancer of left breast, dx July 3rd. Bilateral mastectomy on 8/2 with follow up to remove 8 lymph nodes, one malignant, on 8/26. LOLY flap reconstruction to follow. CINV, CIPN preventative. Chronic low back pain and upper-mid back/shoulders, onset 5+ years ago.     Medical necessity is demonstrated by the following IMPAIRMENTS: Medical Necessity: Decreased quality of life              Aggravating Factors:  standing and prolonged sitting   Relieving Factors:  stretching, NSAIDs     Symptom Description:     Quality:  Aching  Severity:  4  Frequency:  every morning and every day    Previous Treatments Tried:  Chiropractic treatment and Acupuncture        Objective     Observation:     Tongue:  thin white coat, red tip, center line crack, scalloped peeled edges     Body:     Color:     Coating:      Pulse:  cun excess, slippery, dl thin, chi deep              New Findings:      Treatment     Treatment Principles:  Move qi and blood, transform damp, nourish qi, stop pain     Acupuncture points used:  4 TRUDY, BA JANE, BA CLAIRE , Gb21, Gb34, Ki3, Pc6, Sp6, Sp9, SSC, and St36    Bilateral points:  Unilateral points:  Auricular Treatment:  knapp men    Needles In: 30  Needles Out: 30  Needles W/O STIM placed: 2:20  Needles W/O STIM removed: 2:50      Other Traditional Chinese Medicine Modalities - Cupping  Gwasha    Assessment     After treatment, patient felt well, less hot flashes and more energy     Patient prognosis is Good.      Patient will continue to benefit from acupuncture treatment to address the deficits listed in the problem list box on initial evaluation, provide patient family education and to maximize pt's level of independence in the home and community environment.     Patient's spiritual, cultural and educational needs considered and pt agreeable to plan of care and goals.     Anticipated barriers to treatment: none    Plan     Recommend 1 /week for 1 sessions then re-assess.      Education:  Patient is aware of cumulative benefit of acupuncture

## 2024-11-15 ENCOUNTER — INFUSION (OUTPATIENT)
Dept: INFUSION THERAPY | Facility: HOSPITAL | Age: 44
End: 2024-11-15
Payer: COMMERCIAL

## 2024-11-15 VITALS
TEMPERATURE: 98 F | OXYGEN SATURATION: 99 % | DIASTOLIC BLOOD PRESSURE: 74 MMHG | RESPIRATION RATE: 18 BRPM | SYSTOLIC BLOOD PRESSURE: 130 MMHG | HEART RATE: 77 BPM

## 2024-11-15 DIAGNOSIS — Z17.0 MALIGNANT NEOPLASM OF CENTRAL PORTION OF LEFT BREAST IN FEMALE, ESTROGEN RECEPTOR POSITIVE: Primary | ICD-10-CM

## 2024-11-15 DIAGNOSIS — C50.112 MALIGNANT NEOPLASM OF CENTRAL PORTION OF LEFT BREAST IN FEMALE, ESTROGEN RECEPTOR POSITIVE: Primary | ICD-10-CM

## 2024-11-15 PROCEDURE — 25000003 PHARM REV CODE 250: Performed by: NURSE PRACTITIONER

## 2024-11-15 PROCEDURE — 0663T HC SCALP COOLING PLACEMENT MNTR & REMOVAL OF DEVICE: CPT

## 2024-11-15 PROCEDURE — 63600175 PHARM REV CODE 636 W HCPCS: Mod: JG | Performed by: NURSE PRACTITIONER

## 2024-11-15 PROCEDURE — 96413 CHEMO IV INFUSION 1 HR: CPT

## 2024-11-15 PROCEDURE — 96417 CHEMO IV INFUS EACH ADDL SEQ: CPT

## 2024-11-15 PROCEDURE — 96367 TX/PROPH/DG ADDL SEQ IV INF: CPT

## 2024-11-15 RX ORDER — EPINEPHRINE 0.3 MG/.3ML
0.3 INJECTION SUBCUTANEOUS ONCE AS NEEDED
Status: DISCONTINUED | OUTPATIENT
Start: 2024-11-15 | End: 2024-11-15 | Stop reason: HOSPADM

## 2024-11-15 RX ORDER — HEPARIN 100 UNIT/ML
500 SYRINGE INTRAVENOUS
Status: DISCONTINUED | OUTPATIENT
Start: 2024-11-15 | End: 2024-11-15 | Stop reason: HOSPADM

## 2024-11-15 RX ORDER — SODIUM CHLORIDE 0.9 % (FLUSH) 0.9 %
10 SYRINGE (ML) INJECTION
Status: DISCONTINUED | OUTPATIENT
Start: 2024-11-15 | End: 2024-11-15 | Stop reason: HOSPADM

## 2024-11-15 RX ORDER — DIPHENHYDRAMINE HYDROCHLORIDE 50 MG/ML
50 INJECTION INTRAMUSCULAR; INTRAVENOUS ONCE AS NEEDED
Status: DISCONTINUED | OUTPATIENT
Start: 2024-11-15 | End: 2024-11-15 | Stop reason: HOSPADM

## 2024-11-15 RX ORDER — PROCHLORPERAZINE EDISYLATE 5 MG/ML
10 INJECTION INTRAMUSCULAR; INTRAVENOUS ONCE AS NEEDED
Status: DISCONTINUED | OUTPATIENT
Start: 2024-11-15 | End: 2024-11-15 | Stop reason: HOSPADM

## 2024-11-15 RX ADMIN — SODIUM CHLORIDE: 9 INJECTION, SOLUTION INTRAVENOUS at 11:11

## 2024-11-15 RX ADMIN — DOCETAXEL ANHYDROUS 120 MG: 10 INJECTION, SOLUTION INTRAVENOUS at 12:11

## 2024-11-15 RX ADMIN — DEXAMETHASONE SODIUM PHOSPHATE 0.25 MG: 4 INJECTION, SOLUTION INTRA-ARTICULAR; INTRALESIONAL; INTRAMUSCULAR; INTRAVENOUS; SOFT TISSUE at 11:11

## 2024-11-15 RX ADMIN — CYCLOPHOSPHAMIDE 1000 MG: 200 INJECTION, SOLUTION INTRAVENOUS at 01:11

## 2024-11-15 NOTE — PLAN OF CARE
1550-Pt tolerated Taxotere and Cytoxan infusions and Cool cap well, pt refused the last 6 minutes of 2 hour post cool,no complications or side effects, POC and meds discussed with pt,pt aware of upcoming appts, pt knows to call MD with any questions or concerns. Pt ambulated off unit, no distress noted.

## 2024-11-16 ENCOUNTER — INFUSION (OUTPATIENT)
Dept: INFUSION THERAPY | Facility: HOSPITAL | Age: 44
End: 2024-11-16
Payer: COMMERCIAL

## 2024-11-16 DIAGNOSIS — Z17.0 MALIGNANT NEOPLASM OF CENTRAL PORTION OF LEFT BREAST IN FEMALE, ESTROGEN RECEPTOR POSITIVE: Primary | ICD-10-CM

## 2024-11-16 DIAGNOSIS — C50.112 MALIGNANT NEOPLASM OF CENTRAL PORTION OF LEFT BREAST IN FEMALE, ESTROGEN RECEPTOR POSITIVE: Primary | ICD-10-CM

## 2024-11-16 PROCEDURE — 96372 THER/PROPH/DIAG INJ SC/IM: CPT

## 2024-11-16 PROCEDURE — 63600175 PHARM REV CODE 636 W HCPCS: Mod: JZ,JG | Performed by: NURSE PRACTITIONER

## 2024-11-16 RX ADMIN — PEGFLILGRASTIM-FPGK 6 MG: 6 INJECTION, SOLUTION SUBCUTANEOUS at 10:11

## 2024-11-16 NOTE — NURSING
Patient seated in chair, Assssment done.  Stimufend SQ injection administered to left upper posterior arm, tolerated well.  Patient ambulated off floor independently, NAD

## 2024-11-20 NOTE — PROGRESS NOTES
PATIENT IDENTIFICATION:  Patient Name: Bhargavi Gonzalez  MRN: [id number]  : 1980    DIAGNOSIS:  Cancer Staging   Malignant neoplasm of central portion of left breast in female, estrogen receptor positive  Staging form: Breast, AJCC 8th Edition  - Pathologic stage from 2024: Stage IA (pT1c, pN1a, cM0, G2, ER+, [place]+, HER2: Equivocal) - Signed by Ana Shipley MD on 2024      HISTORY OF PRESENT ILLNESS:   The patient is a 44 year old woman with Stage IA IDC of the left breast status post bilateral mastectomy.  She has been referred for consideration of PMRT.    Patient presented with a palpable breast mass in 2023.  Diagnosis was delayed due to relocation.    Left breast biopsy 24 revealed grade 2 invasive ductal carcinoma, ER/NY positive and [id number] negative.    The patient was taken to the OR on 24 for bilateral mastectomy with sentinel lymph node biopsy.  Pathology revealed a grade 2 IDC measuring 1.7 cm with 1 out of 9 lymph nodes positive.    Gyn History:   Menarche: 10  Menopause: premenopause    Age at first pregnancy: 21  Oncology History   Malignant neoplasm of central portion of left breast in female, estrogen receptor positive   2024 Cancer Staged    Staging form: Breast, AJCC 8th Edition  - Pathologic stage from 2024: Stage IA (pT1c, pN1a, cM0, G2, ER+, NY+, HER2: Equivocal)     2024 Initial Diagnosis    Malignant neoplasm of central portion of left breast in female, estrogen receptor positive     10/4/2024 -  Chemotherapy    Treatment Summary   Plan Name: OP BREAST TC (DOCEtaxel cycloPHOSphamide) Q3W  Treatment Goal: Curative  Status: Active  Start Date: 10/4/2024  End Date: 2024  Provider: Ana Shipley MD  Chemotherapy: cycloPHOSphamide 600 mg/m2 = 1,000 mg in 0.9% NaCl 290 mL chemo infusion, 600 mg/m2 = 1,000 mg, Intravenous, Clinic/[facility] 1 time, 4 of 4 cycles  Administration: 1,000 mg (10/4/2024), 1,000 mg (10/25/2024), 1,000 mg (11/15/2024),  1,000 mg (12/6/2024)  DOCEtaxel (TAXOTERE) 120 mg in 0.9% NaCl 297 mL chemo infusion, 126 mg, Intravenous, [facility] 1 time, 4 of 4 cycles  Administration: 120 mg (10/4/2024), 120 mg (10/25/2024), 120 mg (11/15/2024), 120 mg (12/6/2024)          REVIEW OF SYSTEMS:   Review of Systems   Constitutional:  Negative for fever, malaise/fatigue and weight loss.   HENT:  Negative for ear pain, hearing loss, sinus pain and sore throat.    Eyes:  Negative for blurred vision, double vision and pain.   Respiratory:  Negative for cough, hemoptysis, shortness of breath and wheezing.    Cardiovascular:  Negative for chest pain, palpitations and leg swelling.   Gastrointestinal:  Negative for abdominal pain, blood in stool, constipation, diarrhea, heartburn, nausea and vomiting.   Genitourinary:  Negative for dysuria, frequency, hematuria and urgency.   Musculoskeletal:  Negative for back pain and joint pain.   Skin:  Negative for itching and rash.   Neurological:  Negative for tingling, focal weakness, seizures and headaches.   Psychiatric/Behavioral:  Negative for depression. The patient is not nervous/anxious.        PAST MEDICAL HISTORY:  Past Medical History:   Diagnosis Date    Shoulder pain, left 2016    no known injury       PAST SURGICAL HISTORY:  Past Surgical History:   Procedure Laterality Date    BREAST BIOPSY Right 11/2017    fibroadenoma    TUBAL LIGATION  2011       ALLERGIES:   Review of patient's allergies indicates:  No Known Allergies    MEDICATIONS:  Current Outpatient Medications   Medication Sig    EScitalopram oxalate (LEXAPRO) 10 MG tablet Take 10 mg by mouth.    loratadine (CLARITIN) 10 mg tablet Take 10 mg by mouth once daily.    OLANZapine (ZYPREXA) 5 MG tablet Take 1 tablet by mouth nightly on days 1-3 of each chemotherapy cycle.    ondansetron (ZOFRAN) 8 MG tablet Take 1 tablet (8 mg total) by mouth every 8 (eight) hours as needed for Nausea.    prochlorperazine (COMPAZINE) 5 MG tablet Take 2 tablets  (10 mg total) by mouth every 6 (six) hours as needed for Nausea.    tirzepatide 5 mg/0.5 mL PnIj Inject 5 mg into the skin every 7 days.    traMADoL (ULTRAM) 50 mg tablet Take 1 tablet (50 mg total) by mouth every 6 (six) hours as needed for Pain.     No current facility-administered medications for this visit.       SOCIAL HISTORY:  Social History     Socioeconomic History    Marital status: Single   Tobacco Use    Smoking status: Never    Smokeless tobacco: Never   Substance and Sexual Activity    Alcohol use: Not Currently     Social Drivers of Health     Financial Resource Strain: High Risk (2024)    Overall Financial Resource Strain (CARDIA)     Difficulty of Paying Living Expenses: Very hard   Food Insecurity: No Food Insecurity (2024)    Hunger Vital Sign     Worried About Running Out of Food in the Last Year: Never true     Ran Out of Food in the Last Year: Never true   Physical Activity: Insufficiently Active (2024)    Exercise Vital Sign     Days of Exercise per Week: 3 days     Minutes of Exercise per Session: 30 min   Stress: No Stress Concern Present (2024)    [employer] - Occupational Stress Questionnaire     Feeling of Stress : Only a little   Housing Stability: Unknown (2024)    Housing Stability Vital Sign     Unable to Pay for Housing in the Last Year: No       FAMILY HISTORY:  Family History   Problem Relation Name Age of Onset    Diabetes Mother      Hypertension Mother      Depression Sister      No Known Problems Brother      Arthritis Maternal Grandmother      Hypertension Maternal Grandmother           PHYSICAL EXAMINATION:  Vitals:    24 1115   BP: 114/73   Pulse: 88   Resp: 16   Temp: 98.7 °F (37.1 °C)     Body mass index is 29.74 kg/m².    ECO  Physical Exam  Constitutional:       Appearance: Normal appearance.   HENT:      Head: Normocephalic and atraumatic.      Nose: Nose normal.      Mouth/Throat:      Mouth: Mucous  membranes are moist.   Cardiovascular:      Rate and Rhythm: Normal rate.   Pulmonary:      Effort: Pulmonary effort is normal.   Abdominal:      General: Abdomen is flat.      Palpations: Abdomen is soft.   Musculoskeletal:         General: Normal range of motion.      Cervical back: Normal range of motion.   Skin:     General: Skin is warm and dry.   Neurological:      General: No focal deficit present.      Mental Status: She is alert. Mental status is at baseline.             ASSESSMENT/PLAN:  Diagnoses and all orders for this visit:    Malignant neoplasm of central portion of left breast in female, estrogen receptor positive  -     Ambulatory referral/consult to Radiation Oncology      The patient has intermediate risk of recurrence given the positive node with MELANI and young patient age.  She was recommended post-mastectomy radiation therapy to the chest wall, undissected axilla and IMN to a dose of 40 Gy in 15 fractions.  The patient is reluctant to undergo radiation as she is concerned about cosmesis and delay in finishing reconstruction.  I explained that she likely has a low absolute benefit from the addition of radiation given her borderline risk factors.  She would be considered eligible for participation in a clinical trial [facility] which is evaluating the role of elvira irradiation in patients with low Oncotype and low volume disease in the axilla.      At the end of the discussion, the patient was uncertain as to whether she would proceed with PMRT but would contact our department with decision in the coming weeks.      The risks and benefits of treatment have been discussed with the patient and she expressed full understanding. she understands the treatment plan and willing to proceed accordingly.    I spent approximately 60 minutes reviewing the available records and evaluating the patient, out of which over 50% of the time was spent face to face with the patient in counseling and coordinating this  patient's care.

## 2024-11-21 ENCOUNTER — OFFICE VISIT (OUTPATIENT)
Dept: RADIATION ONCOLOGY | Facility: CLINIC | Age: 44
End: 2024-11-21
Payer: COMMERCIAL

## 2024-11-21 ENCOUNTER — TELEPHONE (OUTPATIENT)
Dept: RADIATION ONCOLOGY | Facility: CLINIC | Age: 44
End: 2024-11-21
Payer: COMMERCIAL

## 2024-11-21 VITALS
WEIGHT: 147.25 LBS | SYSTOLIC BLOOD PRESSURE: 114 MMHG | TEMPERATURE: 99 F | OXYGEN SATURATION: 98 % | RESPIRATION RATE: 16 BRPM | HEART RATE: 88 BPM | DIASTOLIC BLOOD PRESSURE: 73 MMHG | BODY MASS INDEX: 29.74 KG/M2

## 2024-11-21 DIAGNOSIS — C50.112 MALIGNANT NEOPLASM OF CENTRAL PORTION OF LEFT BREAST IN FEMALE, ESTROGEN RECEPTOR POSITIVE: ICD-10-CM

## 2024-11-21 DIAGNOSIS — Z17.0 MALIGNANT NEOPLASM OF CENTRAL PORTION OF LEFT BREAST IN FEMALE, ESTROGEN RECEPTOR POSITIVE: Primary | ICD-10-CM

## 2024-11-21 DIAGNOSIS — C50.112 MALIGNANT NEOPLASM OF CENTRAL PORTION OF LEFT BREAST IN FEMALE, ESTROGEN RECEPTOR POSITIVE: Primary | ICD-10-CM

## 2024-11-21 DIAGNOSIS — Z17.0 MALIGNANT NEOPLASM OF CENTRAL PORTION OF LEFT BREAST IN FEMALE, ESTROGEN RECEPTOR POSITIVE: ICD-10-CM

## 2024-11-21 DIAGNOSIS — G89.3 CANCER RELATED PAIN: ICD-10-CM

## 2024-11-21 PROCEDURE — 99999 PR PBB SHADOW E&M-EST. PATIENT-LVL III: CPT | Mod: PBBFAC,,, | Performed by: RADIOLOGY

## 2024-11-21 RX ORDER — TRAMADOL HYDROCHLORIDE 50 MG/1
50 TABLET ORAL EVERY 6 HOURS PRN
Qty: 28 TABLET | Refills: 0 | Status: SHIPPED | OUTPATIENT
Start: 2024-11-21

## 2024-11-25 DIAGNOSIS — D50.8 OTHER IRON DEFICIENCY ANEMIA: Primary | ICD-10-CM

## 2024-11-25 PROBLEM — D50.9 IRON DEFICIENCY ANEMIA: Status: ACTIVE | Noted: 2024-11-25

## 2024-11-25 RX ORDER — DIPHENHYDRAMINE HYDROCHLORIDE 50 MG/ML
50 INJECTION INTRAMUSCULAR; INTRAVENOUS ONCE AS NEEDED
OUTPATIENT
Start: 2024-11-25

## 2024-11-25 RX ORDER — SODIUM CHLORIDE 0.9 % (FLUSH) 0.9 %
10 SYRINGE (ML) INJECTION
OUTPATIENT
Start: 2024-11-25

## 2024-11-25 RX ORDER — EPINEPHRINE 0.3 MG/.3ML
0.3 INJECTION SUBCUTANEOUS ONCE AS NEEDED
OUTPATIENT
Start: 2024-11-25

## 2024-11-25 RX ORDER — HEPARIN 100 UNIT/ML
5 SYRINGE INTRAVENOUS
OUTPATIENT
Start: 2024-11-25

## 2024-12-02 ENCOUNTER — LAB VISIT (OUTPATIENT)
Dept: LAB | Facility: HOSPITAL | Age: 44
End: 2024-12-02
Attending: INTERNAL MEDICINE
Payer: COMMERCIAL

## 2024-12-02 ENCOUNTER — OFFICE VISIT (OUTPATIENT)
Dept: HEMATOLOGY/ONCOLOGY | Facility: CLINIC | Age: 44
End: 2024-12-02
Payer: COMMERCIAL

## 2024-12-02 VITALS
WEIGHT: 147 LBS | TEMPERATURE: 98 F | BODY MASS INDEX: 29.64 KG/M2 | OXYGEN SATURATION: 100 % | RESPIRATION RATE: 18 BRPM | DIASTOLIC BLOOD PRESSURE: 69 MMHG | HEART RATE: 93 BPM | SYSTOLIC BLOOD PRESSURE: 112 MMHG | HEIGHT: 59 IN

## 2024-12-02 DIAGNOSIS — Z79.899 IMMUNODEFICIENCY DUE TO DRUGS: ICD-10-CM

## 2024-12-02 DIAGNOSIS — C79.89 SECONDARY MALIGNANT NEOPLASM OF AXILLA: ICD-10-CM

## 2024-12-02 DIAGNOSIS — Z17.0 MALIGNANT NEOPLASM OF CENTRAL PORTION OF LEFT BREAST IN FEMALE, ESTROGEN RECEPTOR POSITIVE: ICD-10-CM

## 2024-12-02 DIAGNOSIS — Z17.0 MALIGNANT NEOPLASM OF CENTRAL PORTION OF LEFT BREAST IN FEMALE, ESTROGEN RECEPTOR POSITIVE: Primary | ICD-10-CM

## 2024-12-02 DIAGNOSIS — C50.112 MALIGNANT NEOPLASM OF CENTRAL PORTION OF LEFT BREAST IN FEMALE, ESTROGEN RECEPTOR POSITIVE: ICD-10-CM

## 2024-12-02 DIAGNOSIS — C50.112 MALIGNANT NEOPLASM OF CENTRAL PORTION OF LEFT BREAST IN FEMALE, ESTROGEN RECEPTOR POSITIVE: Primary | ICD-10-CM

## 2024-12-02 DIAGNOSIS — D84.821 IMMUNODEFICIENCY DUE TO DRUGS: ICD-10-CM

## 2024-12-02 DIAGNOSIS — D50.9 MICROCYTIC ANEMIA: ICD-10-CM

## 2024-12-02 LAB
ALBUMIN SERPL BCP-MCNC: 3.6 G/DL (ref 3.5–5.2)
ALP SERPL-CCNC: 84 U/L (ref 40–150)
ALT SERPL W/O P-5'-P-CCNC: 18 U/L (ref 10–44)
ANION GAP SERPL CALC-SCNC: 6 MMOL/L (ref 8–16)
AST SERPL-CCNC: 14 U/L (ref 10–40)
BASOPHILS # BLD AUTO: 0.1 K/UL (ref 0–0.2)
BASOPHILS NFR BLD: 1.3 % (ref 0–1.9)
BILIRUB SERPL-MCNC: 0.3 MG/DL (ref 0.1–1)
BUN SERPL-MCNC: 13 MG/DL (ref 6–20)
CALCIUM SERPL-MCNC: 9.2 MG/DL (ref 8.7–10.5)
CHLORIDE SERPL-SCNC: 108 MMOL/L (ref 95–110)
CO2 SERPL-SCNC: 23 MMOL/L (ref 23–29)
CREAT SERPL-MCNC: 0.6 MG/DL (ref 0.5–1.4)
DIFFERENTIAL METHOD BLD: ABNORMAL
EOSINOPHIL # BLD AUTO: 0.1 K/UL (ref 0–0.5)
EOSINOPHIL NFR BLD: 1.1 % (ref 0–8)
ERYTHROCYTE [DISTWIDTH] IN BLOOD BY AUTOMATED COUNT: 17.8 % (ref 11.5–14.5)
EST. GFR  (NO RACE VARIABLE): >60 ML/MIN/1.73 M^2
GLUCOSE SERPL-MCNC: 89 MG/DL (ref 70–110)
HCG INTACT+B SERPL-ACNC: <2.4 MIU/ML
HCT VFR BLD AUTO: 33.8 % (ref 37–48.5)
HGB BLD-MCNC: 10.7 G/DL (ref 12–16)
IMM GRANULOCYTES # BLD AUTO: 0.04 K/UL (ref 0–0.04)
IMM GRANULOCYTES NFR BLD AUTO: 0.5 % (ref 0–0.5)
LYMPHOCYTES # BLD AUTO: 1 K/UL (ref 1–4.8)
LYMPHOCYTES NFR BLD: 12.8 % (ref 18–48)
MCH RBC QN AUTO: 26.6 PG (ref 27–31)
MCHC RBC AUTO-ENTMCNC: 31.7 G/DL (ref 32–36)
MCV RBC AUTO: 84 FL (ref 82–98)
MONOCYTES # BLD AUTO: 0.7 K/UL (ref 0.3–1)
MONOCYTES NFR BLD: 8.7 % (ref 4–15)
NEUTROPHILS # BLD AUTO: 5.9 K/UL (ref 1.8–7.7)
NEUTROPHILS NFR BLD: 75.6 % (ref 38–73)
NRBC BLD-RTO: 0 /100 WBC
PLATELET # BLD AUTO: 247 K/UL (ref 150–450)
PMV BLD AUTO: 10.2 FL (ref 9.2–12.9)
POTASSIUM SERPL-SCNC: 4.5 MMOL/L (ref 3.5–5.1)
PROT SERPL-MCNC: 6.6 G/DL (ref 6–8.4)
RBC # BLD AUTO: 4.02 M/UL (ref 4–5.4)
SODIUM SERPL-SCNC: 137 MMOL/L (ref 136–145)
WBC # BLD AUTO: 7.84 K/UL (ref 3.9–12.7)

## 2024-12-02 PROCEDURE — 80053 COMPREHEN METABOLIC PANEL: CPT | Performed by: INTERNAL MEDICINE

## 2024-12-02 PROCEDURE — 99999 PR PBB SHADOW E&M-EST. PATIENT-LVL III: CPT | Mod: PBBFAC,,, | Performed by: INTERNAL MEDICINE

## 2024-12-02 PROCEDURE — G2211 COMPLEX E/M VISIT ADD ON: HCPCS | Mod: S$GLB,,, | Performed by: INTERNAL MEDICINE

## 2024-12-02 PROCEDURE — 85025 COMPLETE CBC W/AUTO DIFF WBC: CPT | Performed by: INTERNAL MEDICINE

## 2024-12-02 PROCEDURE — 84702 CHORIONIC GONADOTROPIN TEST: CPT | Performed by: INTERNAL MEDICINE

## 2024-12-02 PROCEDURE — 99215 OFFICE O/P EST HI 40 MIN: CPT | Mod: S$GLB,,, | Performed by: INTERNAL MEDICINE

## 2024-12-02 PROCEDURE — 36415 COLL VENOUS BLD VENIPUNCTURE: CPT | Performed by: INTERNAL MEDICINE

## 2024-12-02 RX ORDER — PROCHLORPERAZINE EDISYLATE 5 MG/ML
10 INJECTION INTRAMUSCULAR; INTRAVENOUS ONCE AS NEEDED
Status: CANCELLED | OUTPATIENT
Start: 2024-12-06

## 2024-12-02 RX ORDER — HEPARIN 100 UNIT/ML
500 SYRINGE INTRAVENOUS
Status: CANCELLED | OUTPATIENT
Start: 2024-12-06

## 2024-12-02 RX ORDER — SODIUM CHLORIDE 0.9 % (FLUSH) 0.9 %
10 SYRINGE (ML) INJECTION
Status: CANCELLED | OUTPATIENT
Start: 2024-12-06

## 2024-12-02 RX ORDER — EPINEPHRINE 0.3 MG/.3ML
0.3 INJECTION SUBCUTANEOUS ONCE AS NEEDED
Status: CANCELLED | OUTPATIENT
Start: 2024-12-06

## 2024-12-02 RX ORDER — DIPHENHYDRAMINE HYDROCHLORIDE 50 MG/ML
50 INJECTION INTRAMUSCULAR; INTRAVENOUS ONCE AS NEEDED
Status: CANCELLED | OUTPATIENT
Start: 2024-12-06

## 2024-12-02 NOTE — PROGRESS NOTES
[facility]/ [facility]   at [facility] Clinic Note:      Chief Complaint:   Encounter Diagnoses   Name Primary?    Malignant neoplasm of central portion of left breast in female, estrogen receptor positive Yes    Secondary malignant neoplasm of axilla     Immunodeficiency due to drugs     Microcytic anemia           Cancer Staging   Malignant neoplasm of central portion of left breast in female, estrogen receptor positive  Staging form: Breast, AJCC 8th Edition  - Pathologic stage from 2024: Stage IA (pT1c, pN1a, cM0, G2, ER+, SC+, HER2: Equivocal) - Signed by Ana Shipley MD on 2024      HPI:  Bhargavi Gonzalez is a 44 y.o. female who presents today for C4D1 of TC. She is tolerating treatment overall well. She continues to have some fatigue. She is planning to go back to [place] in January      Oncology History  Patient self palpated a left breast mass in 2023 but had delay due to relocation  Bilateral breast ultrasound 24 showed L breast nodule at 6oc posterior to NAC     L breast bx 24: IDC, grade 2, ER >95%/ SC >95%/ HER2 1+; Ki67 5%    Bilateral mastectomy SLNBx 24: IDC, grade 2, 1.7cm maximal dimension; 1/1 LN+ with EXE  L axillary dissection 0 LN+     Invitae 7/15/24 negative   TC x 4 -  10/4/24 - 24    Gyn History:   Menarche: 10  Menopause: premenopause    Age at first pregnancy: 21    Patient Active Problem List   Diagnosis    Bilateral thumb pain    Malignant neoplasm of central portion of left breast in female, estrogen receptor positive    Chemotherapy-induced nausea    Anxious mood as adjustment reaction    Iron deficiency anemia       Current Outpatient Medications   Medication Instructions    EScitalopram oxalate (LEXAPRO) 10 mg    loratadine (CLARITIN) 10 mg, Daily    OLANZapine (ZYPREXA) 5 MG tablet Take 1 tablet by mouth nightly on days 1-3 of each chemotherapy cycle.    ondansetron (ZOFRAN) 8 mg,  "Oral, Every 8 hours PRN    prochlorperazine (COMPAZINE) 10 mg, Oral, Every 6 hours PRN    tirzepatide 5 mg, Every 7 days    traMADoL (ULTRAM) 50 mg, Oral, Every 6 hours PRN       Review of Systems:   Review of Systems   Constitutional: Negative.    HENT: Negative.     Respiratory: Negative.  Negative for cough and shortness of breath.    Cardiovascular: Negative.  Negative for chest pain.   Gastrointestinal: Negative.  Negative for abdominal pain and diarrhea.   Genitourinary:  Negative for frequency.   Musculoskeletal: Negative.  Negative for back pain.        Generalized aches   Skin:  Negative for rash.   Neurological:  Negative for headaches.   Endo/Heme/Allergies: Negative.    Psychiatric/Behavioral:  The patient is not nervous/anxious.    All other systems reviewed and are negative.      PHYSICAL EXAM:  /69   Pulse 93   Temp 97.9 °F (36.6 °C) (Oral)   Resp 18   Ht 4' 11" (1.499 m)   Wt 66.7 kg (147 lb)   LMP 10/01/2024   SpO2 100%   BMI 29.69 kg/m²     General Appearance:    Alert, cooperative, no distress, appears stated age   Lungs:     Clear to auscultation bilaterally, respirations unlabored    Heart:    Regular rate and rhythm, S1 and S2 normal, no murmur, rub    or gallop   Breast Exam:   Deferred    Extremities:   Extremities normal, atraumatic, no cyanosis or edema   Pulses:   2+ and symmetric all extremities   Skin:   Skin color, texture, turgor normal, no rashes or lesions   Lymph nodes:   Cervical, supraclavicular, and axillary nodes normal   Neurologic:   CNII-XII intact, normal gait     Pertinent Labs & Imaging:  Pathology Results  (Last 30 days)      None            Recent Results (from the past 24 hours)   CBC W/ AUTO DIFFERENTIAL    Collection Time: 12/02/24 10:47 AM   Result Value Ref Range    WBC 7.84 3.90 - 12.70 K/uL    RBC 4.02 4.00 - 5.40 M/uL    Hemoglobin 10.7 (L) 12.0 - 16.0 g/dL    Hematocrit 33.8 (L) 37.0 - 48.5 %    MCV 84 82 - 98 fL    MCH 26.6 (L) 27.0 - 31.0 pg    " MCHC 31.7 (L) 32.0 - 36.0 g/dL    RDW 17.8 (H) 11.5 - 14.5 %    Platelets 247 150 - 450 K/uL    MPV 10.2 9.2 - 12.9 fL    Immature Granulocytes 0.5 0.0 - 0.5 %    Gran # (ANC) 5.9 1.8 - 7.7 K/uL    Immature Grans (Abs) 0.04 0.00 - 0.04 K/uL    Lymph # 1.0 1.0 - 4.8 K/uL    Mono # 0.7 0.3 - 1.0 K/uL    Eos # 0.1 0.0 - 0.5 K/uL    Baso # 0.10 0.00 - 0.20 K/uL    nRBC 0 0 /100 WBC    Gran % 75.6 (H) 38.0 - 73.0 %    Lymph % 12.8 (L) 18.0 - 48.0 %    Mono % 8.7 4.0 - 15.0 %    Eosinophil % 1.1 0.0 - 8.0 %    Basophil % 1.3 0.0 - 1.9 %    Differential Method Automated    CMP    Collection Time: 12/02/24 10:47 AM   Result Value Ref Range    Sodium 137 136 - 145 mmol/L    Potassium 4.5 3.5 - 5.1 mmol/L    Chloride 108 95 - 110 mmol/L    CO2 23 23 - 29 mmol/L    Glucose 89 70 - 110 mg/dL    BUN 13 6 - 20 mg/dL    Creatinine 0.6 0.5 - 1.4 mg/dL    Calcium 9.2 8.7 - 10.5 mg/dL    Total Protein 6.6 6.0 - 8.4 g/dL    Albumin 3.6 3.5 - 5.2 g/dL    Total Bilirubin 0.3 0.1 - 1.0 mg/dL    Alkaline Phosphatase 84 40 - 150 U/L    AST 14 10 - 40 U/L    ALT 18 10 - 44 U/L    eGFR >60.0 >60 mL/min/1.73 m^2    Anion Gap 6 (L) 8 - 16 mmol/L   B-HCG    Collection Time: 12/02/24 10:47 AM   Result Value Ref Range    HCG Quant <2.4 See Text mIU/mL         Assessment & Plan:    1. 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 Secondary malignant neoplasm of axilla    3. Immunodeficiency due to drugs    4. Microcytic anemia      Patient with stage I ER+ breast cancer with 1/9 LN+  Opted for TC x 4 over , completed today (12/2/24)  Tolerating treatment well so far with mild side effects  Labs reviewed and acceptable for C4 on Friday  Would like to meet with rad/onc sooner than later to discuss possible radiation  Follow up in February    Will plan for IV iron with treatment       Route Chart for Scheduling    Med Onc Chart Routing      Follow up with physician . First week of February    Follow up with RAMYA    Infusion scheduling note    Injection scheduling note    Labs None   Scheduling:  Preferred lab:  Lab interval:     Imaging None      Pharmacy appointment    Other referrals                  Treatment Plan Information   OP BREAST TC (DOCEtaxel cycloPHOSphamide) Q3W Ana Shipley MD   Associated diagnosis: Malignant neoplasm of central portion of left breast in female, estrogen receptor positive Stage IA pT1c, pN1a, cM0, G2, ER+, AL+, HER2: Equivocal noted on 9/19/2024   Line of treatment: Adjuvant  Treatment Goal: Curative     Upcoming Treatment Dates - OP BREAST TC (DOCEtaxel cycloPHOSphamide) Q3W    12/6/2024       Pre-Medications       palonosetron 0.25mg/dexAMETHasone 20mg in NS IVPB 0.25 mg 50 mL       Chemotherapy       DOCEtaxel (TAXOTERE) 75 mg/m2 = 126 mg in 0.9% NaCl 256.3 mL chemo infusion       cycloPHOSphamide 600 mg/m2 = 1,000 mg in 0.9% NaCl 255 mL chemo infusion  12/7/2024       Growth Factor       pegfilgrastim-fpgk (Stimufend) injection 6 mg    Therapy Plan Information  INJECTAFER (FERRIC CARBOXYMALTOSE) for Iron deficiency anemia, noted on 11/25/2024  Medications  ferric carboxymaltose (INJECTAFER) 750 mg in 0.9% NaCl 265 mL infusion  750 mg, Intravenous, 1 time a week  Anaphylaxis/Hypersensitivity  EPINEPHrine (EPIPEN) 0.3 mg/0.3 mL pen injection 0.3 mg  0.3 mg, Intramuscular, PRN  diphenhydrAMINE injection 50 mg  50 mg, Intravenous, PRN  hydrocortisone sodium succinate injection 100 mg  100 mg, Intravenous, PRN  Flushes  sodium chloride 0.9% flush 10 mL  10 mL, Intravenous, 1 time a week  heparin, porcine (PF) 100 unit/mL injection flush 500 Units  500 Units, Intravenous, 1 time a week  0.9% NaCl 100 mL flush bag  Intravenous, 1 time a week      No therapy plan of the specified type found.    No therapy plan of the specified type found.    MDM includes  :    - Acute or chronic illness or injury that poses a threat to life or bodily function  - Extensive discussion of  treatment and management  - Prescription drug management  - Drug therapy requiring intensive monitoring for toxicity    Ana Shipley MD   12/02/2024

## 2024-12-06 ENCOUNTER — INFUSION (OUTPATIENT)
Dept: INFUSION THERAPY | Facility: HOSPITAL | Age: 44
End: 2024-12-06
Payer: COMMERCIAL

## 2024-12-06 VITALS
RESPIRATION RATE: 18 BRPM | HEART RATE: 87 BPM | DIASTOLIC BLOOD PRESSURE: 77 MMHG | TEMPERATURE: 98 F | SYSTOLIC BLOOD PRESSURE: 126 MMHG | OXYGEN SATURATION: 99 %

## 2024-12-06 DIAGNOSIS — Z17.0 MALIGNANT NEOPLASM OF CENTRAL PORTION OF LEFT BREAST IN FEMALE, ESTROGEN RECEPTOR POSITIVE: Primary | ICD-10-CM

## 2024-12-06 DIAGNOSIS — C50.112 MALIGNANT NEOPLASM OF CENTRAL PORTION OF LEFT BREAST IN FEMALE, ESTROGEN RECEPTOR POSITIVE: Primary | ICD-10-CM

## 2024-12-06 DIAGNOSIS — D50.8 OTHER IRON DEFICIENCY ANEMIA: ICD-10-CM

## 2024-12-06 PROCEDURE — 96415 CHEMO IV INFUSION ADDL HR: CPT

## 2024-12-06 PROCEDURE — 96417 CHEMO IV INFUS EACH ADDL SEQ: CPT

## 2024-12-06 PROCEDURE — 25000003 PHARM REV CODE 250: Performed by: INTERNAL MEDICINE

## 2024-12-06 PROCEDURE — 96375 TX/PRO/DX INJ NEW DRUG ADDON: CPT

## 2024-12-06 PROCEDURE — 0663T HC SCALP COOLING PLACEMENT MNTR & REMOVAL OF DEVICE: CPT

## 2024-12-06 PROCEDURE — 63600175 PHARM REV CODE 636 W HCPCS: Performed by: INTERNAL MEDICINE

## 2024-12-06 PROCEDURE — 63600175 PHARM REV CODE 636 W HCPCS: Mod: JZ,JG | Performed by: NURSE PRACTITIONER

## 2024-12-06 PROCEDURE — 96367 TX/PROPH/DG ADDL SEQ IV INF: CPT

## 2024-12-06 PROCEDURE — 96413 CHEMO IV INFUSION 1 HR: CPT

## 2024-12-06 PROCEDURE — 25000003 PHARM REV CODE 250: Performed by: NURSE PRACTITIONER

## 2024-12-06 RX ORDER — PROCHLORPERAZINE EDISYLATE 5 MG/ML
10 INJECTION INTRAMUSCULAR; INTRAVENOUS ONCE AS NEEDED
Status: DISCONTINUED | OUTPATIENT
Start: 2024-12-06 | End: 2024-12-06 | Stop reason: HOSPADM

## 2024-12-06 RX ORDER — HEPARIN 100 UNIT/ML
500 SYRINGE INTRAVENOUS
Status: DISCONTINUED | OUTPATIENT
Start: 2024-12-06 | End: 2024-12-06 | Stop reason: HOSPADM

## 2024-12-06 RX ORDER — HEPARIN 100 UNIT/ML
5 SYRINGE INTRAVENOUS
OUTPATIENT
Start: 2024-12-13

## 2024-12-06 RX ORDER — HEPARIN 100 UNIT/ML
5 SYRINGE INTRAVENOUS
Status: DISCONTINUED | OUTPATIENT
Start: 2024-12-06 | End: 2024-12-06 | Stop reason: HOSPADM

## 2024-12-06 RX ORDER — SODIUM CHLORIDE 0.9 % (FLUSH) 0.9 %
10 SYRINGE (ML) INJECTION
OUTPATIENT
Start: 2024-12-13

## 2024-12-06 RX ORDER — DIPHENHYDRAMINE HYDROCHLORIDE 50 MG/ML
50 INJECTION INTRAMUSCULAR; INTRAVENOUS ONCE AS NEEDED
Status: COMPLETED | OUTPATIENT
Start: 2024-12-06 | End: 2024-12-06

## 2024-12-06 RX ORDER — SODIUM CHLORIDE 0.9 % (FLUSH) 0.9 %
10 SYRINGE (ML) INJECTION
Status: DISCONTINUED | OUTPATIENT
Start: 2024-12-06 | End: 2024-12-06 | Stop reason: HOSPADM

## 2024-12-06 RX ORDER — EPINEPHRINE 0.3 MG/.3ML
0.3 INJECTION SUBCUTANEOUS ONCE AS NEEDED
Status: DISCONTINUED | OUTPATIENT
Start: 2024-12-06 | End: 2024-12-06 | Stop reason: HOSPADM

## 2024-12-06 RX ORDER — DIPHENHYDRAMINE HYDROCHLORIDE 50 MG/ML
50 INJECTION INTRAMUSCULAR; INTRAVENOUS ONCE AS NEEDED
OUTPATIENT
Start: 2024-12-13

## 2024-12-06 RX ORDER — EPINEPHRINE 0.3 MG/.3ML
0.3 INJECTION SUBCUTANEOUS ONCE AS NEEDED
OUTPATIENT
Start: 2024-12-13

## 2024-12-06 RX ORDER — FAMOTIDINE 10 MG/ML
20 INJECTION INTRAVENOUS
Status: COMPLETED | OUTPATIENT
Start: 2024-12-06 | End: 2024-12-06

## 2024-12-06 RX ADMIN — DIPHENHYDRAMINE HYDROCHLORIDE 50 MG: 50 INJECTION INTRAMUSCULAR; INTRAVENOUS at 11:12

## 2024-12-06 RX ADMIN — FAMOTIDINE 20 MG: 10 INJECTION INTRAVENOUS at 01:12

## 2024-12-06 RX ADMIN — HYDROCORTISONE SODIUM SUCCINATE 100 MG: 100 INJECTION, POWDER, FOR SOLUTION INTRAMUSCULAR; INTRAVENOUS at 11:12

## 2024-12-06 RX ADMIN — CYCLOPHOSPHAMIDE 1000 MG: 200 INJECTION, SOLUTION INTRAVENOUS at 01:12

## 2024-12-06 RX ADMIN — FERRIC CARBOXYMALTOSE INJECTION 750 MG: 50 INJECTION, SOLUTION INTRAVENOUS at 10:12

## 2024-12-06 RX ADMIN — DOCETAXEL ANHYDROUS 120 MG: 10 INJECTION, SOLUTION INTRAVENOUS at 11:12

## 2024-12-06 NOTE — PLAN OF CARE
Problem: Chemotherapy Effects  Goal: Anemia Symptom Improvement  Outcome: Progressing  Goal: Safety Maintained  Outcome: Progressing  Goal: Absence of Hematuria  Outcome: Progressing  Goal: Nausea and Vomiting Relief  Outcome: Progressing  Goal: Neurotoxicity Symptom Control  Outcome: Progressing  Goal: Absence of Infection  Outcome: Progressing  Goal: Absence of Bleeding  Outcome: Progressing     Ambulatory to clinic with no s/s of infection.  PIV inserted, flushed with out difficulty, blood return noted and infused with no problems. Injectafer, premeds, and Cytoxan tolerated with no problems.  Cool cap initiated and pre/post cool completed.  Administered steroid and benadryl after 5 min of Taxotere infusion due to facial flushing and dark spots in vision and SBP elevation.  Notified MD and resumed infusion after symptoms resolved per MD.  Ambulatory home.  NAD.

## 2024-12-07 ENCOUNTER — INFUSION (OUTPATIENT)
Dept: INFUSION THERAPY | Facility: HOSPITAL | Age: 44
End: 2024-12-07
Payer: COMMERCIAL

## 2024-12-07 DIAGNOSIS — C50.112 MALIGNANT NEOPLASM OF CENTRAL PORTION OF LEFT BREAST IN FEMALE, ESTROGEN RECEPTOR POSITIVE: Primary | ICD-10-CM

## 2024-12-07 DIAGNOSIS — Z17.0 MALIGNANT NEOPLASM OF CENTRAL PORTION OF LEFT BREAST IN FEMALE, ESTROGEN RECEPTOR POSITIVE: Primary | ICD-10-CM

## 2024-12-07 PROCEDURE — 96372 THER/PROPH/DIAG INJ SC/IM: CPT

## 2024-12-07 PROCEDURE — 63600175 PHARM REV CODE 636 W HCPCS: Mod: JZ,JG | Performed by: INTERNAL MEDICINE

## 2024-12-07 RX ADMIN — PEGFLILGRASTIM-FPGK 6 MG: 6 INJECTION, SOLUTION SUBCUTANEOUS at 11:12

## 2024-12-11 RX ORDER — ACETAMINOPHEN 500 MG
1000 TABLET ORAL
OUTPATIENT
Start: 2024-12-16

## 2024-12-11 RX ORDER — FAMOTIDINE 10 MG/ML
20 INJECTION INTRAVENOUS
OUTPATIENT
Start: 2024-12-16

## 2024-12-11 RX ORDER — EPINEPHRINE 0.3 MG/.3ML
0.3 INJECTION SUBCUTANEOUS ONCE AS NEEDED
OUTPATIENT
Start: 2024-12-16

## 2024-12-11 RX ORDER — SODIUM CHLORIDE 9 MG/ML
10 INJECTION, SOLUTION INTRAMUSCULAR; INTRAVENOUS; SUBCUTANEOUS
OUTPATIENT
Start: 2024-12-16

## 2024-12-11 RX ORDER — HEPARIN 100 UNIT/ML
500 SYRINGE INTRAVENOUS
OUTPATIENT
Start: 2024-12-16

## 2024-12-11 RX ORDER — DIPHENHYDRAMINE HYDROCHLORIDE 50 MG/ML
50 INJECTION INTRAMUSCULAR; INTRAVENOUS ONCE AS NEEDED
OUTPATIENT
Start: 2024-12-16

## 2024-12-13 ENCOUNTER — TELEPHONE (OUTPATIENT)
Dept: INFUSION THERAPY | Facility: HOSPITAL | Age: 44
End: 2024-12-13
Payer: COMMERCIAL

## 2024-12-18 ENCOUNTER — PATIENT MESSAGE (OUTPATIENT)
Dept: HEMATOLOGY/ONCOLOGY | Facility: CLINIC | Age: 44
End: 2024-12-18
Payer: COMMERCIAL

## 2024-12-19 DIAGNOSIS — G89.3 CANCER RELATED PAIN: ICD-10-CM

## 2024-12-19 DIAGNOSIS — Z17.0 MALIGNANT NEOPLASM OF CENTRAL PORTION OF LEFT BREAST IN FEMALE, ESTROGEN RECEPTOR POSITIVE: ICD-10-CM

## 2024-12-19 DIAGNOSIS — C50.112 MALIGNANT NEOPLASM OF CENTRAL PORTION OF LEFT BREAST IN FEMALE, ESTROGEN RECEPTOR POSITIVE: ICD-10-CM

## 2024-12-20 RX ORDER — TRAMADOL HYDROCHLORIDE 50 MG/1
50 TABLET ORAL EVERY 6 HOURS PRN
Qty: 28 TABLET | Refills: 0 | Status: SHIPPED | OUTPATIENT
Start: 2024-12-20

## 2024-12-27 ENCOUNTER — LAB VISIT (OUTPATIENT)
Dept: LAB | Facility: HOSPITAL | Age: 44
End: 2024-12-27
Attending: INTERNAL MEDICINE
Payer: COMMERCIAL

## 2024-12-27 DIAGNOSIS — D50.9 MICROCYTIC ANEMIA: ICD-10-CM

## 2024-12-27 DIAGNOSIS — C50.112 MALIGNANT NEOPLASM OF CENTRAL PORTION OF LEFT BREAST IN FEMALE, ESTROGEN RECEPTOR POSITIVE: Primary | ICD-10-CM

## 2024-12-27 DIAGNOSIS — Z17.0 MALIGNANT NEOPLASM OF CENTRAL PORTION OF LEFT BREAST IN FEMALE, ESTROGEN RECEPTOR POSITIVE: ICD-10-CM

## 2024-12-27 DIAGNOSIS — C79.89 SECONDARY MALIGNANT NEOPLASM OF AXILLA: ICD-10-CM

## 2024-12-27 DIAGNOSIS — Z79.899 IMMUNODEFICIENCY DUE TO DRUGS: ICD-10-CM

## 2024-12-27 DIAGNOSIS — Z17.0 MALIGNANT NEOPLASM OF CENTRAL PORTION OF LEFT BREAST IN FEMALE, ESTROGEN RECEPTOR POSITIVE: Primary | ICD-10-CM

## 2024-12-27 DIAGNOSIS — D84.821 IMMUNODEFICIENCY DUE TO DRUGS: ICD-10-CM

## 2024-12-27 DIAGNOSIS — D50.8 OTHER IRON DEFICIENCY ANEMIA: ICD-10-CM

## 2024-12-27 DIAGNOSIS — C50.112 MALIGNANT NEOPLASM OF CENTRAL PORTION OF LEFT BREAST IN FEMALE, ESTROGEN RECEPTOR POSITIVE: ICD-10-CM

## 2024-12-27 LAB
ALBUMIN SERPL BCP-MCNC: 3.6 G/DL (ref 3.5–5.2)
ALP SERPL-CCNC: 61 U/L (ref 40–150)
ALT SERPL W/O P-5'-P-CCNC: 19 U/L (ref 10–44)
ANION GAP SERPL CALC-SCNC: 6 MMOL/L (ref 8–16)
APTT PPP: 28.6 SEC (ref 21–32)
AST SERPL-CCNC: 15 U/L (ref 10–40)
BASOPHILS # BLD AUTO: 0.01 K/UL (ref 0–0.2)
BASOPHILS NFR BLD: 0.2 % (ref 0–1.9)
BILIRUB SERPL-MCNC: 0.4 MG/DL (ref 0.1–1)
BUN SERPL-MCNC: 10 MG/DL (ref 6–20)
CALCIUM SERPL-MCNC: 8.6 MG/DL (ref 8.7–10.5)
CHLORIDE SERPL-SCNC: 108 MMOL/L (ref 95–110)
CO2 SERPL-SCNC: 25 MMOL/L (ref 23–29)
CREAT SERPL-MCNC: 0.8 MG/DL (ref 0.5–1.4)
DIFFERENTIAL METHOD BLD: ABNORMAL
EOSINOPHIL # BLD AUTO: 0.1 K/UL (ref 0–0.5)
EOSINOPHIL NFR BLD: 1 % (ref 0–8)
ERYTHROCYTE [DISTWIDTH] IN BLOOD BY AUTOMATED COUNT: 19.2 % (ref 11.5–14.5)
EST. GFR  (NO RACE VARIABLE): >60 ML/MIN/1.73 M^2
GLUCOSE SERPL-MCNC: 85 MG/DL (ref 70–110)
HCT VFR BLD AUTO: 35.8 % (ref 37–48.5)
HGB BLD-MCNC: 11.1 G/DL (ref 12–16)
IMM GRANULOCYTES # BLD AUTO: 0.02 K/UL (ref 0–0.04)
IMM GRANULOCYTES NFR BLD AUTO: 0.3 % (ref 0–0.5)
INR PPP: 1 (ref 0.8–1.2)
LYMPHOCYTES # BLD AUTO: 1 K/UL (ref 1–4.8)
LYMPHOCYTES NFR BLD: 17.7 % (ref 18–48)
MCH RBC QN AUTO: 27.2 PG (ref 27–31)
MCHC RBC AUTO-ENTMCNC: 31 G/DL (ref 32–36)
MCV RBC AUTO: 88 FL (ref 82–98)
MONOCYTES # BLD AUTO: 0.6 K/UL (ref 0.3–1)
MONOCYTES NFR BLD: 10.7 % (ref 4–15)
NEUTROPHILS # BLD AUTO: 4 K/UL (ref 1.8–7.7)
NEUTROPHILS NFR BLD: 70.1 % (ref 38–73)
NRBC BLD-RTO: 0 /100 WBC
PLATELET # BLD AUTO: 293 K/UL (ref 150–450)
PMV BLD AUTO: 9.6 FL (ref 9.2–12.9)
POTASSIUM SERPL-SCNC: 4.4 MMOL/L (ref 3.5–5.1)
PROT SERPL-MCNC: 6.4 G/DL (ref 6–8.4)
PROTHROMBIN TIME: 11.4 SEC (ref 9–12.5)
RBC # BLD AUTO: 4.08 M/UL (ref 4–5.4)
SODIUM SERPL-SCNC: 139 MMOL/L (ref 136–145)
WBC # BLD AUTO: 5.77 K/UL (ref 3.9–12.7)

## 2024-12-27 PROCEDURE — 36415 COLL VENOUS BLD VENIPUNCTURE: CPT | Performed by: INTERNAL MEDICINE

## 2024-12-27 PROCEDURE — 80053 COMPREHEN METABOLIC PANEL: CPT | Performed by: INTERNAL MEDICINE

## 2024-12-27 PROCEDURE — 85730 THROMBOPLASTIN TIME PARTIAL: CPT | Performed by: INTERNAL MEDICINE

## 2024-12-27 PROCEDURE — 85610 PROTHROMBIN TIME: CPT | Performed by: INTERNAL MEDICINE

## 2024-12-27 PROCEDURE — 85025 COMPLETE CBC W/AUTO DIFF WBC: CPT | Performed by: INTERNAL MEDICINE

## 2025-01-24 ENCOUNTER — PATIENT MESSAGE (OUTPATIENT)
Dept: HEMATOLOGY/ONCOLOGY | Facility: CLINIC | Age: 45
End: 2025-01-24
Payer: COMMERCIAL

## 2025-01-27 ENCOUNTER — TELEPHONE (OUTPATIENT)
Dept: PLASTIC SURGERY | Facility: CLINIC | Age: 45
End: 2025-01-27
Payer: COMMERCIAL

## 2025-01-27 DIAGNOSIS — C50.112 MALIGNANT NEOPLASM OF CENTRAL PORTION OF LEFT BREAST IN FEMALE, ESTROGEN RECEPTOR POSITIVE: Primary | ICD-10-CM

## 2025-01-27 DIAGNOSIS — Z17.0 MALIGNANT NEOPLASM OF CENTRAL PORTION OF LEFT BREAST IN FEMALE, ESTROGEN RECEPTOR POSITIVE: Primary | ICD-10-CM

## 2025-01-27 NOTE — TELEPHONE ENCOUNTER
Talked with pt about delayed flap planning and getting a consult mauri Liang - she is scheduled w her plastic surgeon 2/12 to have LOLY flap in [facility] - When asked when was the next avail flap day, I communicated would not be until June ish - she decided to move forward w the surgery in [facility] since the date avail in [place] was sooner -No additional concerns at this time. All questions and concerns addressed. Stated she will reach out in the future if has any future needs- Pt voiced understanding.

## 2025-02-03 ENCOUNTER — OFFICE VISIT (OUTPATIENT)
Dept: HEMATOLOGY/ONCOLOGY | Facility: CLINIC | Age: 45
End: 2025-02-03
Payer: COMMERCIAL

## 2025-02-03 DIAGNOSIS — Z17.0 MALIGNANT NEOPLASM OF CENTRAL PORTION OF LEFT BREAST IN FEMALE, ESTROGEN RECEPTOR POSITIVE: Primary | ICD-10-CM

## 2025-02-03 DIAGNOSIS — C50.112 MALIGNANT NEOPLASM OF CENTRAL PORTION OF LEFT BREAST IN FEMALE, ESTROGEN RECEPTOR POSITIVE: Primary | ICD-10-CM

## 2025-02-03 DIAGNOSIS — C79.89 SECONDARY MALIGNANT NEOPLASM OF AXILLA: ICD-10-CM

## 2025-02-03 PROCEDURE — G2211 COMPLEX E/M VISIT ADD ON: HCPCS | Mod: 95,,, | Performed by: INTERNAL MEDICINE

## 2025-02-03 PROCEDURE — 98006 SYNCH AUDIO-VIDEO EST MOD 30: CPT | Mod: 95,,, | Performed by: INTERNAL MEDICINE

## 2025-02-05 NOTE — PROGRESS NOTES
[facility]/ [facility]   at [facility] Clinic Note:    The patient location is: [place]  The chief complaint leading to consultation is: breast cancer     Visit type: audiovisual      Each patient to whom he or she provides medical services by telemedicine is:  (1) informed of the relationship between the physician and patient and the respective role of any other health care provider with respect to management of the patient; and (2) notified that he or she may decline to receive medical services by telemedicine and may withdraw from such care at any time.    Notes:     Chief Complaint:   Encounter Diagnoses   Name Primary?    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 female who presents today for follow up. Her reconstruction has been delayed until next week in [place].  She is overall feeling well. No major new issues      Oncology History  Patient self palpated a left breast mass in 2023 but had delay due to relocation  Bilateral breast ultrasound 24 showed L breast nodule at 6oc posterior to NAC     L breast bx 24: IDC, grade 2, ER >95%/ OK >95%/ HER2 1+; Ki67 5%    Bilateral mastectomy SLNBx 24: IDC, grade 2, 1.7cm maximal dimension; 1/1 LN+ with EXE  L axillary dissection 0 LN+     Invitae 7/15/24 negative   TC x 4 -  10/4/24 - 24    Gyn History:   Menarche: 10  Menopause: premenopause    Age at first pregnancy: 21    Patient Active Problem List   Diagnosis    Bilateral thumb pain    Malignant  neoplasm of central portion of left breast in female, estrogen receptor positive    Chemotherapy-induced nausea    Anxious mood as adjustment reaction    Iron deficiency anemia       Current Outpatient Medications   Medication Instructions    EScitalopram oxalate (LEXAPRO) 10 mg    loratadine (CLARITIN) 10 mg, Daily    OLANZapine (ZYPREXA) 5 MG tablet Take 1 tablet by mouth nightly on days 1-3 of each chemotherapy cycle.    ondansetron (ZOFRAN) 8 mg, Oral, Every 8 hours PRN    prochlorperazine (COMPAZINE) 10 mg, Oral, Every 6 hours PRN    tirzepatide 5 mg, Every 7 days    traMADoL (ULTRAM) 50 mg, Oral, Every 6 hours PRN       Review of Systems:   Review of Systems   Constitutional: Negative.    HENT: Negative.     Respiratory: Negative.  Negative for cough and shortness of breath.    Cardiovascular: Negative.  Negative for chest pain.   Gastrointestinal: Negative.  Negative for abdominal pain and diarrhea.   Genitourinary:  Negative for frequency.   Musculoskeletal: Negative.  Negative for back pain.        Generalized aches   Skin:  Negative for rash.   Neurological:  Negative for headaches.   Endo/Heme/Allergies: Negative.    Psychiatric/Behavioral:  The patient is not nervous/anxious.    All other systems reviewed and are negative.      PHYSICAL EXAM:  Deferred     Pertinent Labs & Imaging:  Pathology Results  (Last 30 days)      None            No results found for this or any previous visit (from the past 24 hours).        Assessment & Plan:    1. 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 Secondary malignant neoplasm of axilla      Patient with stage I ER+ breast cancer with 1/9 LN+  Opted for TC x 4 over , completed (12/2/24)  Pending reconstruction. No radiation   Plan for endocrine therapy with tamoxifen. Consideration for OFS + AI   Consideration for kisquali given lymph node involvement         Route Chart for Scheduling    Med Onc Chart  Routing      Follow up with physician 1 month. to discuss ET   Follow up with RAMYA    Infusion scheduling note    Injection scheduling note    Labs    Imaging    Pharmacy appointment    Other referrals                  Treatment Plan Information   OP BREAST TC (DOCEtaxel cycloPHOSphamide) Q3W Ana Shipley MD   Associated diagnosis: Malignant neoplasm of central portion of left breast in female, estrogen receptor positive Stage IA pT1c, pN1a, cM0, G2, ER+, OH+, HER2: Equivocal noted on 9/19/2024   Line of treatment: Adjuvant  Treatment Goal: Curative     Upcoming Treatment Dates - OP BREAST TC (DOCEtaxel cycloPHOSphamide) Q3W    No upcoming days in selected categories.    Therapy Plan Information  IRON DEXTRAN  IV for Iron deficiency anemia, noted on 11/25/2024  Pre-Medications  acetaminophen tablet 1,000 mg  1,000 mg, Oral, Once  famotidine (PF) injection 20 mg  20 mg, Intravenous, Once  diphenhydrAMINE (BENADRYL) 50 mg in 0.9% NaCl 50 mL IVPB  50 mg, Intravenous, Once  Medications  iron dextran (INFED) 25 mg in 0.9% NaCl 100 mL IVPB  25 mg, Intravenous, Once  iron dextran (INFED) 975 mg in 0.9% NaCl 500 mL IVPB  975 mg, Intravenous, Once  Anaphylaxis/Hypersensitivity  EPINEPHrine (EPIPEN) 0.3 mg/0.3 mL pen injection 0.3 mg  0.3 mg, Intramuscular, PRN  diphenhydrAMINE injection 50 mg  50 mg, Intravenous, PRN  hydrocortisone sodium succinate injection 100 mg  100 mg, Intravenous, PRN  Flushes  0.9% NaCl injection 10 mL  10 mL, Intravenous, Once  heparin, porcine (PF) 100 unit/mL injection flush 500 Units  500 Units, Intravenous, PRN  0.9% NaCl 100 mL flush bag  Intravenous, Once      No therapy plan of the specified type found.    No therapy plan of the specified type found.    MDM includes  :    - Acute or chronic illness or injury that poses a threat to life or bodily function  - Extensive discussion of treatment and management  - Prescription drug management  - Drug therapy requiring intensive monitoring for  toxicity    Ana Shipley MD   02/05/2025

## 2025-02-28 ENCOUNTER — PATIENT MESSAGE (OUTPATIENT)
Dept: HEMATOLOGY/ONCOLOGY | Facility: CLINIC | Age: 45
End: 2025-02-28
Payer: COMMERCIAL

## 2025-03-13 ENCOUNTER — OFFICE VISIT (OUTPATIENT)
Dept: HEMATOLOGY/ONCOLOGY | Facility: CLINIC | Age: 45
End: 2025-03-13
Payer: COMMERCIAL

## 2025-03-13 VITALS — HEIGHT: 59 IN | BODY MASS INDEX: 29.69 KG/M2

## 2025-03-13 DIAGNOSIS — C50.112 MALIGNANT NEOPLASM OF CENTRAL PORTION OF LEFT BREAST IN FEMALE, ESTROGEN RECEPTOR POSITIVE: Primary | ICD-10-CM

## 2025-03-13 DIAGNOSIS — C79.89 SECONDARY MALIGNANT NEOPLASM OF AXILLA: ICD-10-CM

## 2025-03-13 DIAGNOSIS — D84.821 IMMUNODEFICIENCY DUE TO DRUGS: ICD-10-CM

## 2025-03-13 DIAGNOSIS — Z79.899 IMMUNODEFICIENCY DUE TO DRUGS: ICD-10-CM

## 2025-03-13 DIAGNOSIS — Z17.0 MALIGNANT NEOPLASM OF CENTRAL PORTION OF LEFT BREAST IN FEMALE, ESTROGEN RECEPTOR POSITIVE: Primary | ICD-10-CM

## 2025-03-13 RX ORDER — TAMOXIFEN CITRATE 20 MG/1
20 TABLET ORAL DAILY
Qty: 30 TABLET | Refills: 11 | Status: SHIPPED | OUTPATIENT
Start: 2025-03-13 | End: 2026-03-13

## 2025-03-13 NOTE — Clinical Note
Barbie Richards, Can you see her again. She has aches with perimenopause and I'm going to start her on tamoxifen  Thanks!

## 2025-03-13 NOTE — PROGRESS NOTES
[facility]/ [facility]   at [facility] Note:    The patient location is: [place]  The chief complaint leading to consultation is: breast cancer     Visit type: audiovisual      Each patient to whom he or she provides medical services by telemedicine is:  (1) informed of the relationship between the physician and patient and the respective role of any other health care provider with respect to management of the patient; and (2) notified that he or she may decline to receive medical services by telemedicine and may withdraw from such care at any time.    Notes:     Chief Complaint:   Encounter Diagnoses   Name Primary?    Malignant neoplasm of central portion of left breast in female, estrogen receptor positive Yes    Secondary malignant neoplasm of axilla     Immunodeficiency due to drugs             Cancer Staging   Malignant neoplasm of central portion of left breast in female, estrogen receptor positive  Staging form: Breast, AJCC 8th Edition  - Pathologic stage from 8/2/2024: Stage IA (pT1c, pN1a, cM0, G2, ER+, NH+, HER2: Equivocal) - Signed by Ana Shipley MD on 9/19/2024      HPI:  Bhargavi Gonzalez is a 44 y.o. female who presents today for follow up. Her reconstruction has now been delayed due to insurance.she is planning to do it this summer in [place]. She presents today to discussed ET   She has been having persistent joint aches for a few years now. She relates this to being perimenopausal      Oncology History  Patient self palpated a left breast mass in December 2023 but had delay due to relocation  Bilateral breast ultrasound 6/21/24 showed L breast nodule at 6oc posterior to NAC     L breast bx 7/2/24: IDC, grade 2, ER >95%/ NH >95%/ HER2 1+; Ki67 5%    Bilateral mastectomy SLNBx 8/2/24: IDC, grade 2, 1.7cm maximal dimension; 1/1 LN+ with EXE  L axillary dissection 0/8 LN+     Invitae 7/15/24 negative   TC x 4 - 10/4/24 -  24    Tamoxifen 2025    Gyn History:   Menarche: 10  Menopause: premenopause; LMP 2024    Age at first pregnancy: 21    Patient Active Problem List   Diagnosis    Bilateral thumb pain    Malignant neoplasm of central portion of left breast in female, estrogen receptor positive    Chemotherapy-induced nausea    Anxious mood as adjustment reaction    Iron deficiency anemia       Current Outpatient Medications   Medication Instructions    EScitalopram oxalate (LEXAPRO) 10 mg    loratadine (CLARITIN) 10 mg, Daily    OLANZapine (ZYPREXA) 5 MG tablet Take 1 tablet by mouth nightly on days 1-3 of each chemotherapy cycle.    ondansetron (ZOFRAN) 8 mg, Oral, Every 8 hours PRN    prochlorperazine (COMPAZINE) 10 mg, Oral, Every 6 hours PRN    tirzepatide 5 mg, Every 7 days    traMADoL (ULTRAM) 50 mg, Oral, Every 6 hours PRN       Review of Systems:   Review of Systems   Constitutional: Negative.    HENT: Negative.     Respiratory: Negative.  Negative for cough and shortness of breath.    Cardiovascular: Negative.  Negative for chest pain.   Gastrointestinal: Negative.  Negative for abdominal pain and diarrhea.   Genitourinary:  Negative for frequency.   Musculoskeletal: Negative.  Negative for back pain.        Generalized aches   Skin:  Negative for rash.   Neurological:  Negative for headaches.   Endo/Heme/Allergies: Negative.    Psychiatric/Behavioral:  The patient is not nervous/anxious.    All other systems reviewed and are negative.      PHYSICAL EXAM:  Deferred     Pertinent Labs & Imaging:  Pathology Results  (Last 30 days)      None            No results found for this or any previous visit (from the past 24 hours).        Assessment & Plan:    1. 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 Secondary malignant neoplasm of axilla    3. Immunodeficiency due to drugs    Patient with stage I ER+ breast cancer with  LN+  Opted for TC x 4 over  , completed (12/2/24)  Pending reconstruction. No radiation   Plan for endocrine therapy with tamoxifen. Start today  Will change to OFS + AI in the coming months. Will plan to order OFS to start ASAP  Follow up for second dose of OFS and to transition to AI if joint aches are better controlled.   Consideration for kisquali given lymph node involvement         Route Chart for Scheduling    Med Onc Chart Routing  Urgent    Follow up with physician . 4-6 weeks for second zoladex   Follow up with RAMYA    Infusion scheduling note New or changed treatment      Injection scheduling note zoladex q4 weeks, start ASAP please   Labs    Imaging    Pharmacy appointment    Other referrals                  Treatment Plan Information   OP BREAST TC (DOCEtaxel cycloPHOSphamide) Q3W Ana Shipley MD   Associated diagnosis: Malignant neoplasm of central portion of left breast in female, estrogen receptor positive Stage IA pT1c, pN1a, cM0, G2, ER+, TX+, HER2: Equivocal noted on 9/19/2024   Line of treatment: Adjuvant  Treatment Goal: Curative     Upcoming Treatment Dates - OP BREAST TC (DOCEtaxel cycloPHOSphamide) Q3W    No upcoming days in selected categories.    Therapy Plan Information  IRON DEXTRAN  IV for Iron deficiency anemia, noted on 11/25/2024  Pre-Medications  acetaminophen tablet 1,000 mg  1,000 mg, Oral, Once  famotidine (PF) injection 20 mg  20 mg, Intravenous, Once  diphenhydrAMINE (BENADRYL) 50 mg in 0.9% NaCl 50 mL IVPB  50 mg, Intravenous, Once  Medications  iron dextran (INFED) 25 mg in 0.9% NaCl 100 mL IVPB  25 mg, Intravenous, Once  iron dextran (INFED) 975 mg in 0.9% NaCl 500 mL IVPB  975 mg, Intravenous, Once  Anaphylaxis/Hypersensitivity  EPINEPHrine (EPIPEN) 0.3 mg/0.3 mL pen injection 0.3 mg  0.3 mg, Intramuscular, PRN  diphenhydrAMINE injection 50 mg  50 mg, Intravenous, PRN  hydrocortisone sodium succinate injection 100 mg  100 mg, Intravenous, PRN  Flushes  0.9% NaCl injection 10 mL  10 mL,  Intravenous, Once  heparin, porcine (PF) 100 unit/mL injection flush 500 Units  500 Units, Intravenous, PRN  0.9% NaCl 100 mL flush bag  Intravenous, Once      No therapy plan of the specified type found.    No therapy plan of the specified type found.    MDM includes  :    - Acute or chronic illness or injury that poses a threat to life or bodily function  - Extensive discussion of treatment and management  - Prescription drug management  - Drug therapy requiring intensive monitoring for toxicity    Ana Shipley MD   03/13/2025

## 2025-03-24 ENCOUNTER — PATIENT MESSAGE (OUTPATIENT)
Dept: HEMATOLOGY/ONCOLOGY | Facility: CLINIC | Age: 45
End: 2025-03-24
Payer: COMMERCIAL

## 2025-04-09 ENCOUNTER — TELEPHONE (OUTPATIENT)
Dept: HEMATOLOGY/ONCOLOGY | Facility: CLINIC | Age: 45
End: 2025-04-09
Payer: COMMERCIAL

## 2025-04-10 ENCOUNTER — INFUSION (OUTPATIENT)
Dept: INFUSION THERAPY | Facility: HOSPITAL | Age: 45
End: 2025-04-10
Payer: COMMERCIAL

## 2025-04-10 ENCOUNTER — OFFICE VISIT (OUTPATIENT)
Dept: HEMATOLOGY/ONCOLOGY | Facility: CLINIC | Age: 45
End: 2025-04-10
Payer: COMMERCIAL

## 2025-04-10 VITALS
DIASTOLIC BLOOD PRESSURE: 73 MMHG | OXYGEN SATURATION: 95 % | BODY MASS INDEX: 28 KG/M2 | WEIGHT: 138.88 LBS | RESPIRATION RATE: 18 BRPM | HEART RATE: 79 BPM | TEMPERATURE: 98 F | HEIGHT: 59 IN | SYSTOLIC BLOOD PRESSURE: 112 MMHG

## 2025-04-10 DIAGNOSIS — Z17.0 MALIGNANT NEOPLASM OF CENTRAL PORTION OF LEFT BREAST IN FEMALE, ESTROGEN RECEPTOR POSITIVE: Primary | ICD-10-CM

## 2025-04-10 DIAGNOSIS — C50.112 MALIGNANT NEOPLASM OF CENTRAL PORTION OF LEFT BREAST IN FEMALE, ESTROGEN RECEPTOR POSITIVE: Primary | ICD-10-CM

## 2025-04-10 DIAGNOSIS — C79.89 SECONDARY MALIGNANT NEOPLASM OF AXILLA: ICD-10-CM

## 2025-04-10 PROCEDURE — 1159F MED LIST DOCD IN RCRD: CPT | Mod: CPTII,S$GLB,, | Performed by: INTERNAL MEDICINE

## 2025-04-10 PROCEDURE — 3078F DIAST BP <80 MM HG: CPT | Mod: CPTII,S$GLB,, | Performed by: INTERNAL MEDICINE

## 2025-04-10 PROCEDURE — 99999 PR PBB SHADOW E&M-EST. PATIENT-LVL IV: CPT | Mod: PBBFAC,,, | Performed by: INTERNAL MEDICINE

## 2025-04-10 PROCEDURE — G2211 COMPLEX E/M VISIT ADD ON: HCPCS | Mod: S$GLB,,, | Performed by: INTERNAL MEDICINE

## 2025-04-10 PROCEDURE — 99215 OFFICE O/P EST HI 40 MIN: CPT | Mod: S$GLB,,, | Performed by: INTERNAL MEDICINE

## 2025-04-10 PROCEDURE — 63600175 PHARM REV CODE 636 W HCPCS: Mod: JZ,TB | Performed by: INTERNAL MEDICINE

## 2025-04-10 PROCEDURE — 3074F SYST BP LT 130 MM HG: CPT | Mod: CPTII,S$GLB,, | Performed by: INTERNAL MEDICINE

## 2025-04-10 PROCEDURE — 3008F BODY MASS INDEX DOCD: CPT | Mod: CPTII,S$GLB,, | Performed by: INTERNAL MEDICINE

## 2025-04-10 PROCEDURE — 96402 CHEMO HORMON ANTINEOPL SQ/IM: CPT

## 2025-04-10 RX ADMIN — GOSERELIN ACETATE 3.6 MG: 3.6 IMPLANT SUBCUTANEOUS at 11:04

## 2025-04-10 NOTE — NURSING
Patient seated in chair, VSS, assessment done.  Zoladex SQ administered to lower abdomen, tolerated well. Pt ambulated off floor independently, NAD.

## 2025-04-10 NOTE — PROGRESS NOTES
[facility]/ [facility]   at [facility] Note:      Chief Complaint:   Encounter Diagnoses   Name Primary?    Malignant neoplasm of central portion of left breast in female, estrogen receptor positive Yes    Secondary malignant neoplasm of axilla           Cancer Staging   Malignant neoplasm of central portion of left breast in female, estrogen receptor positive  Staging form: Breast, AJCC 8th Edition  - Pathologic stage from 2024: Stage IA (pT1c, pN1a, cM0, G2, ER+, NC+, HER2: Equivocal) - Signed by Ana Shipley MD on 2024      HPI:  Bhargavi Gonzalez is a 45 y.o. female who presents today for follow up. She is on tamoxifen. She is tolerating this well without major side effects. She is due for her first dose of zoladex today.     Oncology History  Patient self palpated a left breast mass in 2023 but had delay due to relocation  Bilateral breast ultrasound 24 showed L breast nodule at 6oc posterior to NAC     L breast bx 24: IDC, grade 2, ER >95%/ NC >95%/ HER2 1+; Ki67 5%    Bilateral mastectomy SLNBx 24: IDC, grade 2, 1.7cm maximal dimension; 1/ LN+ with EXE  L axillary dissection  LN+     Invitae 7/15/24 negative   TC x 4 - 10/4/24 - 24    Tamoxifen 2025  Zoladex started 2025    Gyn History:   Menarche: 10  Menopause: premenopause; LMP 2024    Age at first pregnancy: 21    Patient Active Problem List   Diagnosis    Bilateral thumb pain    Malignant neoplasm of central portion of left breast in female, estrogen receptor positive    Chemotherapy-induced nausea    Anxious mood as adjustment reaction    Iron deficiency anemia       Current Outpatient Medications   Medication Instructions    EScitalopram oxalate (LEXAPRO) 10 mg    loratadine (CLARITIN) 10 mg, Daily    OLANZapine (ZYPREXA) 5 MG tablet Take 1 tablet by mouth nightly on days 1-3 of each chemotherapy cycle.    ondansetron  "(ZOFRAN) 8 mg, Oral, Every 8 hours PRN    prochlorperazine (COMPAZINE) 10 mg, Oral, Every 6 hours PRN    ribociclib (KISQALI) 400 mg, Oral, Daily, For 21 days on and 7 days off    tamoxifen (NOLVADEX) 20 mg, Oral, Daily    tirzepatide 5 mg, Every 7 days    traMADoL (ULTRAM) 50 mg, Oral, Every 6 hours PRN       Review of Systems:   Review of Systems   Constitutional: Negative.    HENT: Negative.     Respiratory: Negative.  Negative for cough and shortness of breath.    Cardiovascular: Negative.  Negative for chest pain.   Gastrointestinal: Negative.  Negative for abdominal pain and diarrhea.   Genitourinary:  Negative for frequency.   Musculoskeletal: Negative.  Negative for back pain.        Generalized aches   Skin:  Negative for rash.   Neurological:  Negative for headaches.   Endo/Heme/Allergies: Negative.    Psychiatric/Behavioral:  The patient is not nervous/anxious.    All other systems reviewed and are negative.      PHYSICAL EXAM:  /73 (BP Location: Left arm, Patient Position: Sitting)   Pulse 79   Temp 98.1 °F (36.7 °C) (Oral)   Resp 18   Ht 4' 11" (1.499 m)   Wt 63 kg (138 lb 14.2 oz)   LMP  (LMP Unknown) Comment: last menstrual cycle in August 2024  SpO2 95%   BMI 28.05 kg/m²     General Appearance:    Alert, cooperative, no distress, appears stated age   Head:    Normocephalic, without obvious abnormality, atraumatic   Lungs:     Clear to auscultation bilaterally, respirations unlabored    Heart:    Regular rate and rhythm, S1 and S2 normal, no murmur, rub    or gallop   Extremities:   Extremities normal, atraumatic, no cyanosis or edema   Pulses:   2+ and symmetric all extremities   Skin:   Skin color, texture, turgor normal, no rashes or lesions   Neurologic:   CNII-XII intact, normal gait         Pertinent Labs & Imaging:  Pathology Results  (Last 30 days)      None            No results found for this or any previous visit (from the past 24 hours).        Assessment & Plan:    1. " Malignant neoplasm of central portion of left breast in female, estrogen receptor positive  - ribociclib (KISQALI) 400 mg/day (200 mg x 2) Tab; Take 400 mg by mouth once daily For 21 days on and 7 days off.  Dispense: 42 tablet; Refill: 6  - SCHEDULED EKG 12-LEAD (to Muse); Standing  - CBC W/ AUTO DIFFERENTIAL; Standing  - CMP; Standing    2. Secondary malignant neoplasm of axilla  - ribociclib (KISQALI) 400 mg/day (200 mg x 2) Tab; Take 400 mg by mouth once daily For 21 days on and 7 days off.  Dispense: 42 tablet; Refill: 6  - SCHEDULED EKG 12-LEAD (to Muse); Standing  - CBC W/ AUTO DIFFERENTIAL; Standing  - CMP; Standing    Patient with stage I ER+ breast cancer with 1/9 LN+  Opted for TC x 4 over , completed (12/2/24)  Pending reconstruction. No radiation   Currently on Tamoxifen and tolerating it well. Will start zoladex today. Follow up 4 weeks virtually  If she is tolerating this treatment well, will start letrozole and kisqali. Will initiate order for kisqali today as this will take time for treatment to arrive     Discussed Kisqali as adjunct therapy to AI given LN+ disease. Discussed risks/ benefits of Ibrance including but not limited to neutropenia, infections, leukopenia, fatigue, nausea, stomatitis, anemia, alopecia, diarrhea, thrombocytopenia, rash, vomiting, decreased appetite, asthenia, and pyrexia.   Will monitor complete blood counts prior to the start of Kisqali and at the beginning of each cycle, as well as on Day 15 of the first 2 cycles.   Will need EKG x 2 for Kisqali      Route Chart for Scheduling    Med Onc Chart Routing      Follow up with physician 4 weeks. virtual, labs and EKG   Follow up with RAMYA    Infusion scheduling note    Injection scheduling note    Labs CBC and CMP   Scheduling:  Preferred lab:  Lab interval:     Imaging   EKG   Pharmacy appointment    Other referrals            Treatment Plan Information   OP BREAST TC (DOCEtaxel cycloPHOSphamide) Q3W Ana Shipley MD    Associated diagnosis: Malignant neoplasm of central portion of left breast in female, estrogen receptor positive Stage IA pT1c, pN1a, cM0, G2, ER+, RI+, HER2: Equivocal noted on 9/19/2024   Line of treatment: Adjuvant  Treatment Goal: Curative     Upcoming Treatment Dates - OP BREAST TC (DOCEtaxel cycloPHOSphamide) Q3W    No upcoming days in selected categories.    Supportive Plan Information  OP BREAST GOSERELIN Q4W Ana Shipley MD   Associated Diagnosis: Malignant neoplasm of central portion of left breast in female, estrogen receptor positive Stage IA pT1c, pN1a, cM0, G2, ER+, RI+, HER2: Equivocal noted on 9/19/2024   Line of treatment: Supportive Care   Treatment goal: Supportive     Upcoming Treatment Dates - OP BREAST GOSERELIN Q4W    5/8/2025       Chemotherapy       goserelin (ZOLADEX) injection 3.6 mg  6/5/2025       Chemotherapy       goserelin (ZOLADEX) injection 3.6 mg  7/3/2025       Chemotherapy       goserelin (ZOLADEX) injection 3.6 mg  7/31/2025       Chemotherapy       goserelin (ZOLADEX) injection 3.6 mg    Therapy Plan Information  IRON DEXTRAN  IV for Iron deficiency anemia, noted on 11/25/2024  Pre-Medications  acetaminophen tablet 1,000 mg  1,000 mg, Oral, Once  famotidine (PF) injection 20 mg  20 mg, Intravenous, Once  diphenhydrAMINE (BENADRYL) 50 mg in NS 50 mL IVPB  50 mg, Intravenous, Once  Medications  iron dextran (Infed) 25 mg in 0.9% NaCl 100 mL IVPB  25 mg, Intravenous, Once  iron dextran (Infed) 975 mg in 0.9% NaCl 500 mL IVPB  975 mg, Intravenous, Once  Anaphylaxis/Hypersensitivity  EPINEPHrine (EPIPEN) 0.3 mg/0.3 mL pen injection 0.3 mg  0.3 mg, Intramuscular, PRN  diphenhydrAMINE injection 50 mg  50 mg, Intravenous, PRN  hydrocortisone sodium succinate injection 100 mg  100 mg, Intravenous, PRN  Flushes  0.9% NaCl injection 10 mL  10 mL, Intravenous, Once  heparin, porcine (PF) 100 unit/mL injection flush 500 Units  500 Units, Intravenous, PRN  0.9% NaCl 100 mL flush  bag  Intravenous, Once      No therapy plan of the specified type found.    No therapy plan of the specified type found.    MDM includes  :    - Acute or chronic illness or injury that poses a threat to life or bodily function  - Extensive discussion of treatment and management  - Prescription drug management  - Drug therapy requiring intensive monitoring for toxicity    Ana Shipley MD   04/10/2025

## 2025-04-10 NOTE — Clinical Note
Barbie Neal,   This patient was going to see y'all back in the fall I think but then decided she was going to [place]. However, she decided to come here instead. Can we get her back in with y'all?  Thanks!  Ana

## 2025-04-11 ENCOUNTER — TELEPHONE (OUTPATIENT)
Dept: PLASTIC SURGERY | Facility: CLINIC | Age: 45
End: 2025-04-11
Payer: COMMERCIAL

## 2025-04-11 NOTE — TELEPHONE ENCOUNTER
Spoke to pt and scheduled appt for a consult to see Dr Liang  at [facility], 2nd floor. [address].    Provided patient with appointment time and date, address of the location and call back # to RN navigator. All questions and concerns addressed. Pt verbalized understanding.

## 2025-04-28 ENCOUNTER — PATIENT MESSAGE (OUTPATIENT)
Dept: HEMATOLOGY/ONCOLOGY | Facility: CLINIC | Age: 45
End: 2025-04-28
Payer: COMMERCIAL

## 2025-04-30 ENCOUNTER — CLINICAL SUPPORT (OUTPATIENT)
Dept: OTHER | Facility: CLINIC | Age: 45
End: 2025-04-30

## 2025-04-30 DIAGNOSIS — Z00.8 ENCOUNTER FOR OTHER GENERAL EXAMINATION: ICD-10-CM

## 2025-05-01 VITALS
SYSTOLIC BLOOD PRESSURE: 104 MMHG | BODY MASS INDEX: 27.62 KG/M2 | HEIGHT: 59 IN | WEIGHT: 137 LBS | DIASTOLIC BLOOD PRESSURE: 64 MMHG

## 2025-05-01 LAB
GLUCOSE SERPL-MCNC: 97 MG/DL (ref 60–140)
HDLC SERPL-MCNC: 38 MG/DL
POC CHOLESTEROL, LDL (DOCK): 126 MG/DL
POC CHOLESTEROL, TOTAL: 186 MG/DL
TRIGL SERPL-MCNC: 119 MG/DL

## 2025-05-05 ENCOUNTER — CLINICAL SUPPORT (OUTPATIENT)
Dept: LAB | Facility: HOSPITAL | Age: 45
End: 2025-05-05
Attending: INTERNAL MEDICINE
Payer: COMMERCIAL

## 2025-05-05 DIAGNOSIS — C50.112 MALIGNANT NEOPLASM OF CENTRAL PORTION OF LEFT BREAST IN FEMALE, ESTROGEN RECEPTOR POSITIVE: Primary | ICD-10-CM

## 2025-05-05 DIAGNOSIS — C79.89 SECONDARY MALIGNANT NEOPLASM OF AXILLA: ICD-10-CM

## 2025-05-05 DIAGNOSIS — C50.112 MALIGNANT NEOPLASM OF CENTRAL PORTION OF LEFT BREAST IN FEMALE, ESTROGEN RECEPTOR POSITIVE: ICD-10-CM

## 2025-05-05 DIAGNOSIS — Z17.0 MALIGNANT NEOPLASM OF CENTRAL PORTION OF LEFT BREAST IN FEMALE, ESTROGEN RECEPTOR POSITIVE: ICD-10-CM

## 2025-05-05 DIAGNOSIS — Z17.0 MALIGNANT NEOPLASM OF CENTRAL PORTION OF LEFT BREAST IN FEMALE, ESTROGEN RECEPTOR POSITIVE: Primary | ICD-10-CM

## 2025-05-05 PROCEDURE — 93005 ELECTROCARDIOGRAM TRACING: CPT

## 2025-05-05 PROCEDURE — 93010 ELECTROCARDIOGRAM REPORT: CPT | Mod: ,,, | Performed by: INTERNAL MEDICINE

## 2025-05-06 ENCOUNTER — OFFICE VISIT (OUTPATIENT)
Dept: PLASTIC SURGERY | Facility: CLINIC | Age: 45
End: 2025-05-06
Payer: COMMERCIAL

## 2025-05-06 VITALS
DIASTOLIC BLOOD PRESSURE: 76 MMHG | WEIGHT: 136.88 LBS | SYSTOLIC BLOOD PRESSURE: 116 MMHG | HEART RATE: 66 BPM | BODY MASS INDEX: 27.6 KG/M2 | HEIGHT: 59 IN

## 2025-05-06 DIAGNOSIS — Z17.0 MALIGNANT NEOPLASM OF CENTRAL PORTION OF LEFT BREAST IN FEMALE, ESTROGEN RECEPTOR POSITIVE: Primary | ICD-10-CM

## 2025-05-06 DIAGNOSIS — C50.112 MALIGNANT NEOPLASM OF CENTRAL PORTION OF LEFT BREAST IN FEMALE, ESTROGEN RECEPTOR POSITIVE: Primary | ICD-10-CM

## 2025-05-06 LAB
OHS QRS DURATION: 86 MS
OHS QTC CALCULATION: 426 MS

## 2025-05-06 PROCEDURE — 3008F BODY MASS INDEX DOCD: CPT | Mod: CPTII,S$GLB,, | Performed by: SURGERY

## 2025-05-06 PROCEDURE — 99204 OFFICE O/P NEW MOD 45 MIN: CPT | Mod: S$GLB,,, | Performed by: SURGERY

## 2025-05-06 PROCEDURE — 3074F SYST BP LT 130 MM HG: CPT | Mod: CPTII,S$GLB,, | Performed by: SURGERY

## 2025-05-06 PROCEDURE — 99999 PR PBB SHADOW E&M-EST. PATIENT-LVL III: CPT | Mod: PBBFAC,,, | Performed by: SURGERY

## 2025-05-06 PROCEDURE — 3078F DIAST BP <80 MM HG: CPT | Mod: CPTII,S$GLB,, | Performed by: SURGERY

## 2025-05-07 ENCOUNTER — RESULTS FOLLOW-UP (OUTPATIENT)
Dept: OTHER | Facility: CLINIC | Age: 45
End: 2025-05-07

## 2025-05-09 ENCOUNTER — OFFICE VISIT (OUTPATIENT)
Dept: HEMATOLOGY/ONCOLOGY | Facility: CLINIC | Age: 45
End: 2025-05-09
Payer: COMMERCIAL

## 2025-05-09 DIAGNOSIS — D84.821 IMMUNODEFICIENCY DUE TO DRUGS: ICD-10-CM

## 2025-05-09 DIAGNOSIS — C79.89 SECONDARY MALIGNANT NEOPLASM OF AXILLA: ICD-10-CM

## 2025-05-09 DIAGNOSIS — Z79.899 IMMUNODEFICIENCY DUE TO DRUGS: ICD-10-CM

## 2025-05-09 DIAGNOSIS — Z17.0 MALIGNANT NEOPLASM OF CENTRAL PORTION OF LEFT BREAST IN FEMALE, ESTROGEN RECEPTOR POSITIVE: Primary | ICD-10-CM

## 2025-05-09 DIAGNOSIS — C50.112 MALIGNANT NEOPLASM OF CENTRAL PORTION OF LEFT BREAST IN FEMALE, ESTROGEN RECEPTOR POSITIVE: Primary | ICD-10-CM

## 2025-05-09 RX ORDER — ANASTROZOLE 1 MG/1
1 TABLET ORAL DAILY
Qty: 30 TABLET | Refills: 11 | Status: SHIPPED | OUTPATIENT
Start: 2025-05-09 | End: 2026-05-09

## 2025-05-09 NOTE — PROGRESS NOTES
[facility]/ [facility]   at [facility] Note:    The patient location is: [place]  The chief complaint leading to consultation is: breast cancer    Visit type: audiovisual      Each patient to whom he or she provides medical services by telemedicine is:  (1) informed of the relationship between the physician and patient and the respective role of any other health care provider with respect to management of the patient; and (2) notified that he or she may decline to receive medical services by telemedicine and may withdraw from such care at any time.    Notes:       Chief Complaint:   Encounter Diagnoses   Name Primary?    Malignant neoplasm of central portion of left breast in female, estrogen receptor positive Yes    Secondary malignant neoplasm of axilla     Immunodeficiency due to drugs           Cancer Staging   Malignant neoplasm of central portion of left breast in female, estrogen receptor positive  Staging form: Breast, AJCC 8th Edition  - Pathologic stage from 8/2/2024: Stage IA (pT1c, pN1a, cM0, G2, ER+, VT+, HER2: Equivocal) - Signed by Ana Shipley MD on 9/19/2024      HPI:  Bhargavi Gonzalez is a 45 y.o. female who presents today for follow up. She is on tamoxifen and zoladex. She has some hot flashes but is otherwise tolerating treatment well. She received the kisquali but has not started taking this yet.   She would like to move her injections to [place]       Oncology History  Patient self palpated a left breast mass in December 2023 but had delay due to relocation  Bilateral breast ultrasound 6/21/24 showed L breast nodule at 6oc posterior to NAC     L breast bx 7/2/24: IDC, grade 2, ER >95%/ VT >95%/ HER2 1+; Ki67 5%    Bilateral mastectomy SLNBx 8/2/24: IDC, grade 2, 1.7cm maximal dimension; 1/1 LN+ with EXE  L axillary dissection 0/8 LN+     Invitae 7/15/24 negative   TC x 4 - 10/4/24 - 12/2/24    Tamoxifen March 2025  Zoladex started  2025  Changed tamoxifen to anastrozole May 2025  Started kisqali May 2025    Gyn History:   Menarche: 10  Menopause: premenopause; LMP 2024    Age at first pregnancy: 21    Patient Active Problem List   Diagnosis    Bilateral thumb pain    Malignant neoplasm of central portion of left breast in female, estrogen receptor positive    Chemotherapy-induced nausea    Anxious mood as adjustment reaction    Iron deficiency anemia    Secondary malignant neoplasm of axilla       Current Outpatient Medications   Medication Instructions    EScitalopram oxalate (LEXAPRO) 10 mg    loratadine (CLARITIN) 10 mg, Daily    ondansetron (ZOFRAN) 8 mg, Oral, Every 8 hours PRN    ribociclib (KISQALI) 400 mg/day (200 mg x 2) Tab Take 2 tablets (400 mg) by mouth once daily For 21 days on and 7 days off.    tamoxifen (NOLVADEX) 20 mg, Oral, Daily    tirzepatide 5 mg, Every 7 days    traMADoL (ULTRAM) 50 mg, Oral, Every 6 hours PRN       Review of Systems:   Review of Systems   Constitutional: Negative.    HENT: Negative.     Respiratory: Negative.  Negative for cough and shortness of breath.    Cardiovascular: Negative.  Negative for chest pain.   Gastrointestinal: Negative.  Negative for abdominal pain and diarrhea.   Genitourinary:  Negative for frequency.   Musculoskeletal: Negative.  Negative for back pain.        Generalized aches   Skin:  Negative for rash.   Neurological:  Negative for headaches.   Endo/Heme/Allergies: Negative.    Psychiatric/Behavioral:  The patient is not nervous/anxious.    All other systems reviewed and are negative.      PHYSICAL EXAM:  Virtual visit     Pertinent Labs & Imaging:  Pathology Results  (Last 30 days)      None            No results found for this or any previous visit (from the past 24 hours).        Assessment & Plan:    1. 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 Secondary malignant neoplasm of axilla    3. Immunodeficiency  due to drugs    Patient with stage I ER+ breast cancer with 1/9 LN+  Opted for TC x 4 over , completed (12/2/24)  Pending reconstruction. No radiation   Currently on Tamoxifen and tolerating it well.   Now that she is on zoladex, will change tamoxifen to anastrozole and start kisqali  She will repeat EKG in 2 weeks and again in 4 weeks   Repeat labs as above   Follow up 4 weeks         Route Chart for Scheduling    Med Onc Chart Routing      Follow up with physician 4 weeks. with labs and ekg   Follow up with RAMYA    Infusion scheduling note    Injection scheduling note    Labs CBC and CMP   Scheduling:  Preferred lab:  Lab interval:  q2wk x 2 then q4wk   Imaging   Ekg x 2 q2wk   Pharmacy appointment    Other referrals              Treatment Plan Information   OP BREAST TC (DOCEtaxel cycloPHOSphamide) Q3W Ana Shipley MD   Associated diagnosis: Malignant neoplasm of central portion of left breast in female, estrogen receptor positive Stage IA pT1c, pN1a, cM0, G2, ER+, MA+, HER2: Equivocal noted on 9/19/2024   Line of treatment: Adjuvant  Treatment Goal: Curative     Upcoming Treatment Dates - OP BREAST TC (DOCEtaxel cycloPHOSphamide) Q3W    No upcoming days in selected categories.    Supportive Plan Information  OP BREAST GOSERELIN Q4W Ana Shipley MD   Associated Diagnosis: Malignant neoplasm of central portion of left breast in female, estrogen receptor positive Stage IA pT1c, pN1a, cM0, G2, ER+, MA+, HER2: Equivocal noted on 9/19/2024   Line of treatment: Supportive Care   Treatment goal: Supportive     Upcoming Treatment Dates - OP BREAST GOSERELIN Q4W    5/8/2025       Chemotherapy       goserelin (ZOLADEX) injection 3.6 mg  6/5/2025       Chemotherapy       goserelin (ZOLADEX) injection 3.6 mg  7/3/2025       Chemotherapy       goserelin (ZOLADEX) injection 3.6 mg  7/31/2025       Chemotherapy       goserelin (ZOLADEX) injection 3.6 mg    Therapy Plan Information  IRON DEXTRAN  IV for Iron  deficiency anemia, noted on 11/25/2024  Pre-Medications  acetaminophen tablet 1,000 mg  1,000 mg, Oral, Once  famotidine (PF) injection 20 mg  20 mg, Intravenous, Once  diphenhydrAMINE (BENADRYL) 50 mg in NS 50 mL IVPB  50 mg, Intravenous, Once  Medications  iron dextran (Infed) 25 mg in 0.9% NaCl 100 mL IVPB  25 mg, Intravenous, Once  iron dextran (Infed) 975 mg in 0.9% NaCl 500 mL IVPB  975 mg, Intravenous, Once  Anaphylaxis/Hypersensitivity  EPINEPHrine (EPIPEN) 0.3 mg/0.3 mL pen injection 0.3 mg  0.3 mg, Intramuscular, PRN  diphenhydrAMINE injection 50 mg  50 mg, Intravenous, PRN  hydrocortisone sodium succinate injection 100 mg  100 mg, Intravenous, PRN  Flushes  0.9% NaCl injection 10 mL  10 mL, Intravenous, Once  heparin, porcine (PF) 100 unit/mL injection flush 500 Units  500 Units, Intravenous, PRN  0.9% NaCl 100 mL flush bag  Intravenous, Once      No therapy plan of the specified type found.    No therapy plan of the specified type found.    MDM includes  :    - Acute or chronic illness or injury that poses a threat to life or bodily function  - Independent review and explanation of 3+ results from unique tests  - Discussion of management and ordering 3+ unique tests  - Extensive discussion of treatment and management  - Prescription drug management  - Drug therapy requiring intensive monitoring for toxicity      Ana Shipley MD   05/09/2025

## 2025-05-10 ENCOUNTER — INFUSION (OUTPATIENT)
Dept: INFUSION THERAPY | Facility: HOSPITAL | Age: 45
End: 2025-05-10
Payer: COMMERCIAL

## 2025-05-10 VITALS
DIASTOLIC BLOOD PRESSURE: 84 MMHG | SYSTOLIC BLOOD PRESSURE: 122 MMHG | RESPIRATION RATE: 18 BRPM | TEMPERATURE: 98 F | HEART RATE: 74 BPM

## 2025-05-10 DIAGNOSIS — C50.112 MALIGNANT NEOPLASM OF CENTRAL PORTION OF LEFT BREAST IN FEMALE, ESTROGEN RECEPTOR POSITIVE: Primary | ICD-10-CM

## 2025-05-10 DIAGNOSIS — Z17.0 MALIGNANT NEOPLASM OF CENTRAL PORTION OF LEFT BREAST IN FEMALE, ESTROGEN RECEPTOR POSITIVE: Primary | ICD-10-CM

## 2025-05-10 PROCEDURE — 96402 CHEMO HORMON ANTINEOPL SQ/IM: CPT

## 2025-05-10 PROCEDURE — 63600175 PHARM REV CODE 636 W HCPCS: Mod: JZ,TB | Performed by: INTERNAL MEDICINE

## 2025-05-10 RX ADMIN — GOSERELIN ACETATE 3.6 MG: 3.6 IMPLANT SUBCUTANEOUS at 12:05

## 2025-05-10 NOTE — NURSING
1200 pt here for zoladex injection, pt tolerated well to right abdomen, pt does not have any appts scheduled at this time, will check portal, no distress noted upon d/c to home

## 2025-05-13 ENCOUNTER — PATIENT MESSAGE (OUTPATIENT)
Dept: HEMATOLOGY/ONCOLOGY | Facility: CLINIC | Age: 45
End: 2025-05-13
Payer: COMMERCIAL

## 2025-05-14 DIAGNOSIS — T45.1X5A CHEMOTHERAPY-INDUCED NAUSEA: ICD-10-CM

## 2025-05-14 DIAGNOSIS — R11.0 CHEMOTHERAPY-INDUCED NAUSEA: ICD-10-CM

## 2025-05-14 DIAGNOSIS — C50.112 MALIGNANT NEOPLASM OF CENTRAL PORTION OF LEFT BREAST IN FEMALE, ESTROGEN RECEPTOR POSITIVE: ICD-10-CM

## 2025-05-14 DIAGNOSIS — Z17.0 MALIGNANT NEOPLASM OF CENTRAL PORTION OF LEFT BREAST IN FEMALE, ESTROGEN RECEPTOR POSITIVE: ICD-10-CM

## 2025-05-14 RX ORDER — ONDANSETRON 8 MG/1
8 TABLET, FILM COATED ORAL EVERY 8 HOURS PRN
Qty: 30 TABLET | Refills: 2 | Status: SHIPPED | OUTPATIENT
Start: 2025-05-14 | End: 2026-05-14

## 2025-05-15 DIAGNOSIS — Z17.0 MALIGNANT NEOPLASM OF CENTRAL PORTION OF LEFT BREAST IN FEMALE, ESTROGEN RECEPTOR POSITIVE: Primary | ICD-10-CM

## 2025-05-15 DIAGNOSIS — C50.112 MALIGNANT NEOPLASM OF CENTRAL PORTION OF LEFT BREAST IN FEMALE, ESTROGEN RECEPTOR POSITIVE: Primary | ICD-10-CM

## 2025-05-16 ENCOUNTER — PATIENT MESSAGE (OUTPATIENT)
Dept: ADMINISTRATIVE | Facility: OTHER | Age: 45
End: 2025-05-16
Payer: COMMERCIAL

## 2025-05-18 ENCOUNTER — PATIENT MESSAGE (OUTPATIENT)
Dept: HEMATOLOGY/ONCOLOGY | Facility: CLINIC | Age: 45
End: 2025-05-18
Payer: COMMERCIAL

## 2025-05-26 ENCOUNTER — CLINICAL SUPPORT (OUTPATIENT)
Dept: LAB | Facility: HOSPITAL | Age: 45
End: 2025-05-26
Attending: INTERNAL MEDICINE
Payer: COMMERCIAL

## 2025-05-26 DIAGNOSIS — Z17.0 MALIGNANT NEOPLASM OF CENTRAL PORTION OF LEFT BREAST IN FEMALE, ESTROGEN RECEPTOR POSITIVE: ICD-10-CM

## 2025-05-26 DIAGNOSIS — C50.112 MALIGNANT NEOPLASM OF CENTRAL PORTION OF LEFT BREAST IN FEMALE, ESTROGEN RECEPTOR POSITIVE: ICD-10-CM

## 2025-05-26 DIAGNOSIS — C79.89 SECONDARY MALIGNANT NEOPLASM OF AXILLA: ICD-10-CM

## 2025-05-26 LAB
ABSOLUTE EOSINOPHIL (OHS): 0.26 K/UL
ABSOLUTE MONOCYTE (OHS): 0.25 K/UL (ref 0.3–1)
ABSOLUTE NEUTROPHIL COUNT (OHS): 2.34 K/UL (ref 1.8–7.7)
ALBUMIN SERPL BCP-MCNC: 3.6 G/DL (ref 3.5–5.2)
ALP SERPL-CCNC: 61 UNIT/L (ref 40–150)
ALT SERPL W/O P-5'-P-CCNC: 12 UNIT/L (ref 10–44)
ANION GAP (OHS): 9 MMOL/L (ref 8–16)
AST SERPL-CCNC: 12 UNIT/L (ref 11–45)
BASOPHILS # BLD AUTO: 0.03 K/UL
BASOPHILS NFR BLD AUTO: 0.7 %
BILIRUB SERPL-MCNC: 0.4 MG/DL (ref 0.1–1)
BUN SERPL-MCNC: 15 MG/DL (ref 6–20)
CALCIUM SERPL-MCNC: 8.4 MG/DL (ref 8.7–10.5)
CHLORIDE SERPL-SCNC: 107 MMOL/L (ref 95–110)
CO2 SERPL-SCNC: 22 MMOL/L (ref 23–29)
CREAT SERPL-MCNC: 0.9 MG/DL (ref 0.5–1.4)
ERYTHROCYTE [DISTWIDTH] IN BLOOD BY AUTOMATED COUNT: 13.9 % (ref 11.5–14.5)
GFR SERPLBLD CREATININE-BSD FMLA CKD-EPI: >60 ML/MIN/1.73/M2
GLUCOSE SERPL-MCNC: 90 MG/DL (ref 70–110)
HCT VFR BLD AUTO: 36.1 % (ref 37–48.5)
HGB BLD-MCNC: 11.8 GM/DL (ref 12–16)
IMM GRANULOCYTES # BLD AUTO: 0.01 K/UL (ref 0–0.04)
IMM GRANULOCYTES NFR BLD AUTO: 0.2 % (ref 0–0.5)
LYMPHOCYTES # BLD AUTO: 1.58 K/UL (ref 1–4.8)
MCH RBC QN AUTO: 28.3 PG (ref 27–31)
MCHC RBC AUTO-ENTMCNC: 32.7 G/DL (ref 32–36)
MCV RBC AUTO: 87 FL (ref 82–98)
NUCLEATED RBC (/100WBC) (OHS): 0 /100 WBC
OHS QRS DURATION: 84 MS
OHS QTC CALCULATION: 423 MS
PLATELET # BLD AUTO: 232 K/UL (ref 150–450)
PMV BLD AUTO: 9.5 FL (ref 9.2–12.9)
POTASSIUM SERPL-SCNC: 3.9 MMOL/L (ref 3.5–5.1)
PROT SERPL-MCNC: 7.2 GM/DL (ref 6–8.4)
RBC # BLD AUTO: 4.17 M/UL (ref 4–5.4)
RELATIVE EOSINOPHIL (OHS): 5.8 %
RELATIVE LYMPHOCYTE (OHS): 35.3 % (ref 18–48)
RELATIVE MONOCYTE (OHS): 5.6 % (ref 4–15)
RELATIVE NEUTROPHIL (OHS): 52.4 % (ref 38–73)
SODIUM SERPL-SCNC: 138 MMOL/L (ref 136–145)
WBC # BLD AUTO: 4.47 K/UL (ref 3.9–12.7)

## 2025-05-26 PROCEDURE — 80053 COMPREHEN METABOLIC PANEL: CPT

## 2025-05-26 PROCEDURE — 36415 COLL VENOUS BLD VENIPUNCTURE: CPT

## 2025-05-26 PROCEDURE — 85025 COMPLETE CBC W/AUTO DIFF WBC: CPT

## 2025-05-26 PROCEDURE — 93010 ELECTROCARDIOGRAM REPORT: CPT | Mod: ,,, | Performed by: STUDENT IN AN ORGANIZED HEALTH CARE EDUCATION/TRAINING PROGRAM

## 2025-05-26 PROCEDURE — 93005 ELECTROCARDIOGRAM TRACING: CPT
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
## 2025-05-26 NOTE — ASSESSMENT & PLAN NOTE
She has history of BL mastectomy with TE for staged LOLY flap recon.  Has been rescheduled multiple times    We discussed autologous reconstruction with the most common donor sites (abdomen, thigh, back). Explained that this procedure will reconstruction the breast with the patients own living tissue, without the need for an implant, but in exchange for a longer initial procedure and on average three day hospital stay afterwards.  Discussed flap monitoring, risks of take back/flap failure/ expected hospital course, use of drains, and recovery.  Risks including flap loss, fat necrosis, infection, wound breakdown, seroma, hematoma, scarring, need for reoperation, blood clots were all covered.  Typical revision procedures including lifting the breast, tailoring the skin, liposuction and fat grafting for contour were also discussed.     Illustrated for her how LOLY flap is done.  All questions answered. Also discussed areolar or IMF skin paddles and ultimately nipple tattooing.   She would like to proceed but started a new job and needs to wait several months.  Her timeline is convenient.  Will look to schedule her in the fall  Also discussed, CTA, pre op photos, and pre-op visit.

## 2025-05-28 ENCOUNTER — PATIENT MESSAGE (OUTPATIENT)
Dept: HEMATOLOGY/ONCOLOGY | Facility: CLINIC | Age: 45
End: 2025-05-28
Payer: COMMERCIAL

## 2025-05-29 DIAGNOSIS — C50.112 MALIGNANT NEOPLASM OF CENTRAL PORTION OF LEFT BREAST IN FEMALE, ESTROGEN RECEPTOR POSITIVE: Primary | ICD-10-CM

## 2025-05-29 DIAGNOSIS — Z17.0 MALIGNANT NEOPLASM OF CENTRAL PORTION OF LEFT BREAST IN FEMALE, ESTROGEN RECEPTOR POSITIVE: Primary | ICD-10-CM

## 2025-05-29 RX ORDER — ESCITALOPRAM OXALATE 20 MG/1
20 TABLET ORAL DAILY
Qty: 90 TABLET | Refills: 3 | Status: SHIPPED | OUTPATIENT
Start: 2025-05-29

## 2025-05-30 ENCOUNTER — PATIENT MESSAGE (OUTPATIENT)
Dept: PLASTIC SURGERY | Facility: CLINIC | Age: 45
End: 2025-05-30
Payer: COMMERCIAL

## 2025-05-30 DIAGNOSIS — R11.0 CHEMOTHERAPY-INDUCED NAUSEA: ICD-10-CM

## 2025-05-30 DIAGNOSIS — T45.1X5A CHEMOTHERAPY-INDUCED NAUSEA: ICD-10-CM

## 2025-05-30 DIAGNOSIS — C50.112 MALIGNANT NEOPLASM OF CENTRAL PORTION OF LEFT BREAST IN FEMALE, ESTROGEN RECEPTOR POSITIVE: ICD-10-CM

## 2025-05-30 DIAGNOSIS — Z17.0 MALIGNANT NEOPLASM OF CENTRAL PORTION OF LEFT BREAST IN FEMALE, ESTROGEN RECEPTOR POSITIVE: ICD-10-CM

## 2025-05-30 RX ORDER — ONDANSETRON 8 MG/1
8 TABLET, FILM COATED ORAL EVERY 8 HOURS PRN
Qty: 30 TABLET | Refills: 2 | Status: CANCELLED | OUTPATIENT
Start: 2025-05-30 | End: 2026-05-30

## 2025-06-02 DIAGNOSIS — R11.0 CHEMOTHERAPY-INDUCED NAUSEA: ICD-10-CM

## 2025-06-02 DIAGNOSIS — C50.112 MALIGNANT NEOPLASM OF CENTRAL PORTION OF LEFT BREAST IN FEMALE, ESTROGEN RECEPTOR POSITIVE: Primary | ICD-10-CM

## 2025-06-02 DIAGNOSIS — T45.1X5A CHEMOTHERAPY-INDUCED NAUSEA: ICD-10-CM

## 2025-06-02 DIAGNOSIS — Z01.818 PRE-OP EXAM: Primary | ICD-10-CM

## 2025-06-02 DIAGNOSIS — Z17.0 MALIGNANT NEOPLASM OF CENTRAL PORTION OF LEFT BREAST IN FEMALE, ESTROGEN RECEPTOR POSITIVE: ICD-10-CM

## 2025-06-02 DIAGNOSIS — C50.112 MALIGNANT NEOPLASM OF CENTRAL PORTION OF LEFT BREAST IN FEMALE, ESTROGEN RECEPTOR POSITIVE: ICD-10-CM

## 2025-06-02 DIAGNOSIS — Z17.0 MALIGNANT NEOPLASM OF CENTRAL PORTION OF LEFT BREAST IN FEMALE, ESTROGEN RECEPTOR POSITIVE: Primary | ICD-10-CM

## 2025-06-02 RX ORDER — ONDANSETRON 8 MG/1
8 TABLET, FILM COATED ORAL EVERY 8 HOURS PRN
Qty: 30 TABLET | Refills: 2 | Status: SHIPPED | OUTPATIENT
Start: 2025-06-02 | End: 2026-06-02

## 2025-06-04 ENCOUNTER — LAB VISIT (OUTPATIENT)
Dept: LAB | Facility: HOSPITAL | Age: 45
End: 2025-06-04
Attending: INTERNAL MEDICINE
Payer: COMMERCIAL

## 2025-06-04 DIAGNOSIS — C50.112 MALIGNANT NEOPLASM OF CENTRAL PORTION OF LEFT BREAST IN FEMALE, ESTROGEN RECEPTOR POSITIVE: ICD-10-CM

## 2025-06-04 DIAGNOSIS — Z17.0 MALIGNANT NEOPLASM OF CENTRAL PORTION OF LEFT BREAST IN FEMALE, ESTROGEN RECEPTOR POSITIVE: ICD-10-CM

## 2025-06-04 DIAGNOSIS — C79.89 SECONDARY MALIGNANT NEOPLASM OF AXILLA: ICD-10-CM

## 2025-06-04 LAB
ABSOLUTE EOSINOPHIL (OHS): 0.65 K/UL
ABSOLUTE EOSINOPHIL (OHS): 0.65 K/UL
ABSOLUTE MONOCYTE (OHS): 0.23 K/UL (ref 0.3–1)
ABSOLUTE MONOCYTE (OHS): 0.27 K/UL (ref 0.3–1)
ABSOLUTE NEUTROPHIL COUNT (OHS): 1.29 K/UL (ref 1.8–7.7)
ABSOLUTE NEUTROPHIL COUNT (OHS): 1.29 K/UL (ref 1.8–7.7)
ALBUMIN SERPL BCP-MCNC: 3.8 G/DL (ref 3.5–5.2)
ALP SERPL-CCNC: 66 UNIT/L (ref 40–150)
ALT SERPL W/O P-5'-P-CCNC: 14 UNIT/L (ref 10–44)
ANION GAP (OHS): 5 MMOL/L (ref 8–16)
AST SERPL-CCNC: 14 UNIT/L (ref 11–45)
BASOPHILS # BLD AUTO: 0.08 K/UL
BASOPHILS # BLD AUTO: 0.1 K/UL
BASOPHILS NFR BLD AUTO: 1.9 %
BASOPHILS NFR BLD AUTO: 2.4 %
BILIRUB SERPL-MCNC: 0.2 MG/DL (ref 0.1–1)
BUN SERPL-MCNC: 13 MG/DL (ref 6–20)
CALCIUM SERPL-MCNC: 8.8 MG/DL (ref 8.7–10.5)
CHLORIDE SERPL-SCNC: 107 MMOL/L (ref 95–110)
CO2 SERPL-SCNC: 28 MMOL/L (ref 23–29)
CREAT SERPL-MCNC: 0.8 MG/DL (ref 0.5–1.4)
ERYTHROCYTE [DISTWIDTH] IN BLOOD BY AUTOMATED COUNT: 14.4 % (ref 11.5–14.5)
ERYTHROCYTE [DISTWIDTH] IN BLOOD BY AUTOMATED COUNT: 14.5 % (ref 11.5–14.5)
GFR SERPLBLD CREATININE-BSD FMLA CKD-EPI: >60 ML/MIN/1.73/M2
GLUCOSE SERPL-MCNC: 85 MG/DL (ref 70–110)
HCT VFR BLD AUTO: 35.9 % (ref 37–48.5)
HCT VFR BLD AUTO: 36.9 % (ref 37–48.5)
HGB BLD-MCNC: 12 GM/DL (ref 12–16)
HGB BLD-MCNC: 12.5 GM/DL (ref 12–16)
IMM GRANULOCYTES # BLD AUTO: 0.01 K/UL (ref 0–0.04)
IMM GRANULOCYTES # BLD AUTO: 0.02 K/UL (ref 0–0.04)
IMM GRANULOCYTES NFR BLD AUTO: 0.2 % (ref 0–0.5)
IMM GRANULOCYTES NFR BLD AUTO: 0.5 % (ref 0–0.5)
LYMPHOCYTES # BLD AUTO: 1.81 K/UL (ref 1–4.8)
LYMPHOCYTES # BLD AUTO: 1.82 K/UL (ref 1–4.8)
MCH RBC QN AUTO: 29.2 PG (ref 27–31)
MCH RBC QN AUTO: 29.8 PG (ref 27–31)
MCHC RBC AUTO-ENTMCNC: 33.4 G/DL (ref 32–36)
MCHC RBC AUTO-ENTMCNC: 33.9 G/DL (ref 32–36)
MCV RBC AUTO: 87 FL (ref 82–98)
MCV RBC AUTO: 88 FL (ref 82–98)
NUCLEATED RBC (/100WBC) (OHS): 0 /100 WBC
NUCLEATED RBC (/100WBC) (OHS): 0 /100 WBC
PLATELET # BLD AUTO: 229 K/UL (ref 150–450)
PLATELET # BLD AUTO: 239 K/UL (ref 150–450)
PMV BLD AUTO: 9.6 FL (ref 9.2–12.9)
PMV BLD AUTO: 9.8 FL (ref 9.2–12.9)
POTASSIUM SERPL-SCNC: 3.6 MMOL/L (ref 3.5–5.1)
PROT SERPL-MCNC: 7.2 GM/DL (ref 6–8.4)
RBC # BLD AUTO: 4.11 M/UL (ref 4–5.4)
RBC # BLD AUTO: 4.2 M/UL (ref 4–5.4)
RELATIVE EOSINOPHIL (OHS): 15.8 %
RELATIVE EOSINOPHIL (OHS): 15.8 %
RELATIVE LYMPHOCYTE (OHS): 44 % (ref 18–48)
RELATIVE LYMPHOCYTE (OHS): 44.3 % (ref 18–48)
RELATIVE MONOCYTE (OHS): 5.6 % (ref 4–15)
RELATIVE MONOCYTE (OHS): 6.6 % (ref 4–15)
RELATIVE NEUTROPHIL (OHS): 31.4 % (ref 38–73)
RELATIVE NEUTROPHIL (OHS): 31.5 % (ref 38–73)
SODIUM SERPL-SCNC: 140 MMOL/L (ref 136–145)
WBC # BLD AUTO: 4.11 K/UL (ref 3.9–12.7)
WBC # BLD AUTO: 4.11 K/UL (ref 3.9–12.7)

## 2025-06-04 PROCEDURE — 36415 COLL VENOUS BLD VENIPUNCTURE: CPT

## 2025-06-04 PROCEDURE — 85025 COMPLETE CBC W/AUTO DIFF WBC: CPT | Mod: 91

## 2025-06-04 PROCEDURE — 80053 COMPREHEN METABOLIC PANEL: CPT

## 2025-06-05 ENCOUNTER — TELEPHONE (OUTPATIENT)
Dept: HEMATOLOGY/ONCOLOGY | Facility: CLINIC | Age: 45
End: 2025-06-05
Payer: COMMERCIAL

## 2025-06-06 ENCOUNTER — OFFICE VISIT (OUTPATIENT)
Dept: HEMATOLOGY/ONCOLOGY | Facility: CLINIC | Age: 45
End: 2025-06-06
Payer: COMMERCIAL

## 2025-06-06 DIAGNOSIS — C79.89 SECONDARY MALIGNANT NEOPLASM OF AXILLA: ICD-10-CM

## 2025-06-06 DIAGNOSIS — C50.112 MALIGNANT NEOPLASM OF CENTRAL PORTION OF LEFT BREAST IN FEMALE, ESTROGEN RECEPTOR POSITIVE: Primary | ICD-10-CM

## 2025-06-06 DIAGNOSIS — Z79.899 IMMUNODEFICIENCY DUE TO DRUGS: ICD-10-CM

## 2025-06-06 DIAGNOSIS — D84.821 IMMUNODEFICIENCY DUE TO DRUGS: ICD-10-CM

## 2025-06-06 DIAGNOSIS — Z17.0 MALIGNANT NEOPLASM OF CENTRAL PORTION OF LEFT BREAST IN FEMALE, ESTROGEN RECEPTOR POSITIVE: Primary | ICD-10-CM

## 2025-06-06 RX ORDER — ESCITALOPRAM OXALATE 5 MG/1
5 TABLET ORAL DAILY
Qty: 30 TABLET | Refills: 2 | Status: SHIPPED | OUTPATIENT
Start: 2025-06-06 | End: 2026-06-06

## 2025-06-07 ENCOUNTER — INFUSION (OUTPATIENT)
Dept: INFUSION THERAPY | Facility: HOSPITAL | Age: 45
End: 2025-06-07
Payer: COMMERCIAL

## 2025-06-07 DIAGNOSIS — C50.112 MALIGNANT NEOPLASM OF CENTRAL PORTION OF LEFT BREAST IN FEMALE, ESTROGEN RECEPTOR POSITIVE: Primary | ICD-10-CM

## 2025-06-07 DIAGNOSIS — C50.112 MALIGNANT NEOPLASM OF CENTRAL PORTION OF LEFT BREAST IN FEMALE, ESTROGEN RECEPTOR POSITIVE: ICD-10-CM

## 2025-06-07 DIAGNOSIS — C79.89 SECONDARY MALIGNANT NEOPLASM OF AXILLA: ICD-10-CM

## 2025-06-07 DIAGNOSIS — Z17.0 MALIGNANT NEOPLASM OF CENTRAL PORTION OF LEFT BREAST IN FEMALE, ESTROGEN RECEPTOR POSITIVE: ICD-10-CM

## 2025-06-07 DIAGNOSIS — Z17.0 MALIGNANT NEOPLASM OF CENTRAL PORTION OF LEFT BREAST IN FEMALE, ESTROGEN RECEPTOR POSITIVE: Primary | ICD-10-CM

## 2025-06-07 PROCEDURE — 63600175 PHARM REV CODE 636 W HCPCS: Mod: JZ,TB | Performed by: INTERNAL MEDICINE

## 2025-06-07 PROCEDURE — 96402 CHEMO HORMON ANTINEOPL SQ/IM: CPT

## 2025-06-07 RX ADMIN — GOSERELIN ACETATE 3.6 MG: 3.6 IMPLANT SUBCUTANEOUS at 11:06

## 2025-06-09 RX ORDER — ANASTROZOLE 1 MG/1
1 TABLET ORAL DAILY
Qty: 30 TABLET | Refills: 11 | Status: SHIPPED | OUTPATIENT
Start: 2025-06-09 | End: 2026-06-09

## 2025-06-20 DIAGNOSIS — M25.522 LEFT ELBOW PAIN: Primary | ICD-10-CM

## 2025-07-01 ENCOUNTER — PATIENT MESSAGE (OUTPATIENT)
Dept: HEMATOLOGY/ONCOLOGY | Facility: CLINIC | Age: 45
End: 2025-07-01
Payer: COMMERCIAL

## 2025-07-02 ENCOUNTER — PATIENT MESSAGE (OUTPATIENT)
Dept: INTERNAL MEDICINE | Facility: CLINIC | Age: 45
End: 2025-07-02

## 2025-07-02 ENCOUNTER — OFFICE VISIT (OUTPATIENT)
Dept: INTERNAL MEDICINE | Facility: CLINIC | Age: 45
End: 2025-07-02
Payer: COMMERCIAL

## 2025-07-02 RX ORDER — TIRZEPATIDE 10 MG/.5ML
10 INJECTION, SOLUTION SUBCUTANEOUS
Qty: 2 ML | Refills: 2 | Status: ACTIVE | OUTPATIENT
Start: 2025-07-02

## 2025-07-02 NOTE — PROGRESS NOTES
Patient ID: Bhargavi Gonzalez is a 45 y.o. White female    Subjective  Chief Complaint: patient presents for medical weight loss management.    Contraindications to GLP-1 receptor agonist therapy:   Denies personal or family history of MTC and personal history of MEN2       Co-morbidities: none    History of weight loss therapy:  Patient reported taking tirzepatide 10mg 06/2025 ( record shows tirzepatide 5 mg/0.5 mL PnIj 10/2024 )    Weight loss history:  Starting weight:    6/30/2025   Recent Readings    Weight (lbs) 135 lb    BMI 27.26 BMI        Objective  Lab Results   Component Value Date     06/04/2025     05/26/2025     05/05/2025     Lab Results   Component Value Date    K 3.6 06/04/2025    K 3.9 05/26/2025    K 3.9 05/05/2025     Lab Results   Component Value Date     06/04/2025     05/26/2025     05/05/2025     Lab Results   Component Value Date    CO2 28 06/04/2025    CO2 22 (L) 05/26/2025    CO2 22 (L) 05/05/2025     Lab Results   Component Value Date    BUN 13 06/04/2025    BUN 15 05/26/2025    BUN 12 05/05/2025     Lab Results   Component Value Date    GLU 85 06/04/2025    GLU 90 05/26/2025    GLU 93 05/05/2025     Lab Results   Component Value Date    CALCIUM 8.8 06/04/2025    CALCIUM 8.4 (L) 05/26/2025    CALCIUM 9.3 05/05/2025     Lab Results   Component Value Date    PROT 7.2 06/04/2025    PROT 7.2 05/26/2025    PROT 7.1 05/05/2025     Lab Results   Component Value Date    ALBUMIN 3.8 06/04/2025    ALBUMIN 3.6 05/26/2025    ALBUMIN 3.7 05/05/2025     Lab Results   Component Value Date    BILITOT 0.2 06/04/2025    BILITOT 0.4 05/26/2025    BILITOT 0.3 05/05/2025     Lab Results   Component Value Date    AST 14 06/04/2025    AST 12 05/26/2025    AST 18 05/05/2025     Lab Results   Component Value Date    ALT 14 06/04/2025    ALT 12 05/26/2025    ALT 19 05/05/2025     Lab Results   Component Value Date    ANIONGAP 5 (L) 06/04/2025    ANIONGAP 9 05/26/2025     ANIONGAP 9 05/05/2025     Lab Results   Component Value Date    CREATININE 0.8 06/04/2025    CREATININE 0.9 05/26/2025    CREATININE 0.7 05/05/2025     Lab Results   Component Value Date    EGFRNORACEVR >60 06/04/2025    EGFRNORACEVR >60 05/26/2025    EGFRNORACEVR >60 05/05/2025     Assessment/Plan  -Continuation of GLP-1 RA therapy  -Continue Zepbound 10mg SQ once weekly  -RTC in 3 months for follow-up evaluation     Patient consented to pharmacist management via collaborative practice.

## 2025-07-03 ENCOUNTER — TELEPHONE (OUTPATIENT)
Dept: INFUSION THERAPY | Facility: HOSPITAL | Age: 45
End: 2025-07-03
Payer: COMMERCIAL

## 2025-07-07 ENCOUNTER — OFFICE VISIT (OUTPATIENT)
Dept: URGENT CARE | Facility: CLINIC | Age: 45
End: 2025-07-07
Payer: COMMERCIAL

## 2025-07-07 ENCOUNTER — TELEPHONE (OUTPATIENT)
Dept: INFUSION THERAPY | Facility: HOSPITAL | Age: 45
End: 2025-07-07
Payer: COMMERCIAL

## 2025-07-07 VITALS
WEIGHT: 136 LBS | HEIGHT: 58 IN | RESPIRATION RATE: 18 BRPM | SYSTOLIC BLOOD PRESSURE: 116 MMHG | DIASTOLIC BLOOD PRESSURE: 77 MMHG | OXYGEN SATURATION: 98 % | BODY MASS INDEX: 28.55 KG/M2 | TEMPERATURE: 98 F | HEART RATE: 66 BPM

## 2025-07-07 DIAGNOSIS — J30.2 SEASONAL ALLERGIES: ICD-10-CM

## 2025-07-07 DIAGNOSIS — H65.193 ACUTE EFFUSION OF BOTH MIDDLE EARS: Primary | ICD-10-CM

## 2025-07-07 PROBLEM — C50.919 MALIGNANT TUMOR OF BREAST: Status: ACTIVE | Noted: 2024-08-03

## 2025-07-07 PROBLEM — C50.912 INVASIVE DUCTAL CARCINOMA OF BREAST, FEMALE, LEFT: Status: ACTIVE | Noted: 2024-07-19

## 2025-07-07 PROCEDURE — 99203 OFFICE O/P NEW LOW 30 MIN: CPT | Mod: S$GLB,,, | Performed by: PHYSICIAN ASSISTANT

## 2025-07-07 RX ORDER — ACETAMINOPHEN 500 MG
1000 TABLET ORAL
COMMUNITY
Start: 2024-07-30

## 2025-07-07 RX ORDER — AZELASTINE 1 MG/ML
2 SPRAY, METERED NASAL 2 TIMES DAILY
Qty: 30 ML | Refills: 0 | Status: SHIPPED | OUTPATIENT
Start: 2025-07-07 | End: 2025-09-01

## 2025-07-07 RX ORDER — GABAPENTIN 300 MG/1
300 CAPSULE ORAL
COMMUNITY
Start: 2025-01-09

## 2025-07-07 RX ORDER — CELECOXIB 100 MG/1
100 CAPSULE ORAL
COMMUNITY
Start: 2025-01-09

## 2025-07-07 RX ORDER — SEMAGLUTIDE 0.68 MG/ML
INJECTION, SOLUTION SUBCUTANEOUS
COMMUNITY

## 2025-07-07 NOTE — PATIENT INSTRUCTIONS
I believe the fluid behind your ear is what is causing the dizziness.  Take your home Claritin as directed for fluid behind the ear.  Use nasal spray as directed for postnasal drip.  Tylenol with food as needed for pain relief.  If your dizziness does not improve please reach out to your oncologist as it may be a side effect your medications.  Follow up with urgent care as needed.  If your symptoms worsen please go to the ER for evaluation

## 2025-07-07 NOTE — PROGRESS NOTES
"Subjective:      Patient ID: Bhargavi Gonzalez is a 45 y.o. female.    Vitals:  height is 4' 10" (1.473 m) and weight is 61.7 kg (136 lb). Her oral temperature is 97.8 °F (36.6 °C). Her blood pressure is 117/79 and her pulse is 67. Her respiration is 18 and oxygen saturation is 98%.     Chief Complaint: Other Misc (Entered by patient)    45 yr old female has complaints of dizziness. Her symptoms started yesterday.  Patient states that she took a Claritin and it did help with the symptoms last night.  Dizziness only last for a few sec worse with movement.  Patient denies any shortness a breath, cough, chest pain, fever, chills, body aches, headache, vision changes.  Patient is also being treated for breast cancer follows with her oncologist regularly has follow up appointment coming up.    Other  This is a new problem. The current episode started in the past 7 days. The problem occurs constantly. The problem has been gradually worsening. Associated symptoms include vertigo. Pertinent negatives include no abdominal pain, anorexia, arthralgias, change in bowel habit, chest pain, chills, congestion, coughing, diaphoresis, fatigue, fever, headaches, joint swelling, myalgias, nausea, neck pain, numbness, rash, sore throat, swollen glands, urinary symptoms, visual change, vomiting or weakness. Nothing aggravates the symptoms.       Constitution: Negative for appetite change, chills, sweating, fatigue and fever.   HENT:  Positive for postnasal drip. Negative for ear pain, ear discharge, drooling, mouth sores, tongue pain, tongue lesion, congestion, sinus pain, sinus pressure, sore throat, trouble swallowing and voice change.    Neck: Negative for neck pain and neck stiffness.   Cardiovascular:  Negative for chest pain.   Eyes:  Negative for photophobia and blurred vision.   Respiratory:  Negative for cough and shortness of breath.    Gastrointestinal:  Negative for abdominal pain, nausea and vomiting.   Musculoskeletal:  " Negative for joint pain, joint swelling and muscle ache.   Skin:  Negative for rash.   Neurological:  Positive for dizziness and history of vertigo. Negative for headaches and numbness.      Past Medical History:   Diagnosis Date    Shoulder pain, left 2016    no known injury       Past Surgical History:   Procedure Laterality Date    BREAST BIOPSY Right 11/2017    fibroadenoma    TUBAL LIGATION  2011       Family History   Problem Relation Name Age of Onset    Diabetes Mother      Hypertension Mother      Depression Sister      No Known Problems Brother      Arthritis Maternal Grandmother      Hypertension Maternal Grandmother         Social History     Socioeconomic History    Marital status: Single   Tobacco Use    Smoking status: Never     Passive exposure: Never    Smokeless tobacco: Never   Substance and Sexual Activity    Alcohol use: Not Currently     Social Drivers of Health     Financial Resource Strain: Low Risk  (3/13/2025)    Overall Financial Resource Strain (CARDIA)     Difficulty of Paying Living Expenses: Not hard at all   Food Insecurity: No Food Insecurity (3/13/2025)    Hunger Vital Sign     Worried About Running Out of Food in the Last Year: Never true     Ran Out of Food in the Last Year: Never true   Transportation Needs: No Transportation Needs (3/13/2025)    PRAPARE - Transportation     Lack of Transportation (Medical): No     Lack of Transportation (Non-Medical): No   Physical Activity: Insufficiently Active (3/13/2025)    Exercise Vital Sign     Days of Exercise per Week: 5 days     Minutes of Exercise per Session: 20 min   Stress: Stress Concern Present (3/13/2025)    [employer] - Occupational Stress Questionnaire     Feeling of Stress : To some extent   Housing Stability: High Risk (3/13/2025)    Housing Stability Vital Sign     Unable to Pay for Housing in the Last Year: Yes     Number of Times Moved in the Last Year: 1     Homeless in the Last Year: No        Current Medications[1]    Review of patient's allergies indicates:  No Known Allergies    Objective:     Physical Exam   Constitutional:  Non-toxic appearance. She does not appear ill. No distress.   HENT:   Head: Normocephalic and atraumatic.   Ears:   Right Ear: External ear and ear canal normal. Tympanic membrane is not injected, not erythematous, not retracted and not bulging. A middle ear effusion is present. no impacted cerumen  Left Ear: External ear and ear canal normal. Tympanic membrane is not injected, not erythematous, not retracted and not bulging. A middle ear effusion is present. no impacted cerumen     Comments: Shaheed-Hallpike negative bilaterally  Nose: Rhinorrhea present. No congestion. Right sinus exhibits no maxillary sinus tenderness and no frontal sinus tenderness. Left sinus exhibits no maxillary sinus tenderness and no frontal sinus tenderness.   Mouth/Throat: Mucous membranes are moist. No oropharyngeal exudate or posterior oropharyngeal erythema. Tonsils are 1+ on the right. Tonsils are 1+ on the left. No tonsillar exudate.   Eyes: Conjunctivae are normal. Right eye exhibits no discharge. Left eye exhibits no discharge.   Neck: Neck supple. Carotid bruit is not present.   Cardiovascular: Normal rate, regular rhythm and normal heart sounds.   No murmur heard.Exam reveals no gallop and no friction rub.   Pulmonary/Chest: Effort normal and breath sounds normal. No stridor. No respiratory distress. She has no wheezes. She has no rhonchi. She has no rales.   Abdominal: Normal appearance.   Musculoskeletal:      Cervical back: She exhibits no tenderness.   Lymphadenopathy:     She has no cervical adenopathy.   Neurological: She is alert.   Skin: Skin is warm, dry, not diaphoretic and no rash.   Psychiatric: Her behavior is normal. Mood normal.   Nursing note and vitals reviewed.      Assessment:     1. Acute effusion of both middle ears    2. Seasonal allergies        Plan:       Acute  effusion of both middle ears    Seasonal allergies  -     azelastine (ASTELIN) 137 mcg (0.1 %) nasal spray; 2 sprays (274 mcg total) by Nasal route 2 (two) times daily.  Dispense: 30 mL; Refill: 0          Medical Decision Making:   Initial Assessment:   Effusion and ears, seasonal allergies  Differential Diagnosis:   Vertigo, MI, viral URI, medication side effect  Clinical Tests:   Medical Tests: Ordered and Reviewed  Urgent Care Management:  Over-the-counter Claritin, azelastine nasal spray     Results reviewed  I have reviewed the patient chart and pertinent past imaging/labs.    Patient Instructions   I believe the fluid behind your ear is what is causing the dizziness.  Take your home Claritin as directed for fluid behind the ear.  Use nasal spray as directed for postnasal drip.  Tylenol with food as needed for pain relief.  If your dizziness does not improve please reach out to your oncologist as it may be a side effect your medications.  Follow up with urgent care as needed.  If your symptoms worsen please go to the ER for evaluation           [1]   Current Outpatient Medications   Medication Sig Dispense Refill    acetaminophen (TYLENOL) 500 MG tablet Take 1,000 mg by mouth.      anastrozole (ARIMIDEX) 1 mg Tab Take 1 tablet (1 mg total) by mouth once daily. 30 tablet 11    celecoxib (CELEBREX) 100 MG capsule Take 100 mg by mouth.      EScitalopram oxalate (LEXAPRO) 5 MG Tab Take 1 tablet (5 mg total) by mouth once daily. 30 tablet 2    gabapentin (NEURONTIN) 300 MG capsule Take 300 mg by mouth.      loratadine (CLARITIN) 10 mg tablet Take 10 mg by mouth once daily.      ondansetron (ZOFRAN) 8 MG tablet Take 1 tablet (8 mg total) by mouth every 8 (eight) hours as needed for Nausea. 30 tablet 2    ribociclib (KISQALI) 400 mg/day (200 mg x 2) Tab Take 2 tablets (400 mg) by mouth once daily For 21 days on and 7 days off. 42 tablet 6    tirzepatide, weight loss, (ZEPBOUND) 10 mg/0.5 mL PnIj Inject 10 mg into the  skin every 7 days. 2 mL 2    semaglutide (OZEMPIC) 0.25 mg or 0.5 mg (2 mg/3 mL) pen injector Inject into the skin.       No current facility-administered medications for this visit.

## 2025-07-07 NOTE — LETTER
July 7, 2025      [facility]  [address] [place] [address]  Phone: [phone]  Fax: [phone]       Patient: Bhargavi Gonzalez   YOB: 1980  Date of Visit: 07/07/2025    To Whom It May Concern:    Francoise Gonzalez  was at [facility] on 07/07/2025. The patient may return to work/school on 07/08/2025 with no restrictions if symptom improvement. If you have any questions or concerns, or if I can be of further assistance, please do not hesitate to contact me.    Sincerely,      Regina Mendez PA-C

## 2025-07-08 ENCOUNTER — INFUSION (OUTPATIENT)
Dept: INFUSION THERAPY | Facility: HOSPITAL | Age: 45
End: 2025-07-08
Attending: INTERNAL MEDICINE
Payer: COMMERCIAL

## 2025-07-08 VITALS
TEMPERATURE: 98 F | RESPIRATION RATE: 18 BRPM | DIASTOLIC BLOOD PRESSURE: 71 MMHG | SYSTOLIC BLOOD PRESSURE: 120 MMHG | HEART RATE: 73 BPM | OXYGEN SATURATION: 98 %

## 2025-07-08 DIAGNOSIS — C50.919 MALIGNANT TUMOR OF BREAST: Primary | ICD-10-CM

## 2025-07-08 PROCEDURE — 63600175 PHARM REV CODE 636 W HCPCS: Mod: JZ,TB | Performed by: STUDENT IN AN ORGANIZED HEALTH CARE EDUCATION/TRAINING PROGRAM

## 2025-07-08 PROCEDURE — 96402 CHEMO HORMON ANTINEOPL SQ/IM: CPT

## 2025-07-08 RX ADMIN — GOSERELIN ACETATE 3.6 MG: 3.6 IMPLANT SUBCUTANEOUS at 12:07

## 2025-07-09 ENCOUNTER — TELEPHONE (OUTPATIENT)
Dept: HEMATOLOGY/ONCOLOGY | Facility: CLINIC | Age: 45
End: 2025-07-09
Payer: COMMERCIAL

## 2025-07-10 ENCOUNTER — OFFICE VISIT (OUTPATIENT)
Dept: HEMATOLOGY/ONCOLOGY | Facility: CLINIC | Age: 45
End: 2025-07-10
Payer: COMMERCIAL

## 2025-07-10 DIAGNOSIS — C79.89 SECONDARY MALIGNANT NEOPLASM OF AXILLA: ICD-10-CM

## 2025-07-10 DIAGNOSIS — Z17.0 MALIGNANT NEOPLASM OF CENTRAL PORTION OF LEFT BREAST IN FEMALE, ESTROGEN RECEPTOR POSITIVE: Primary | ICD-10-CM

## 2025-07-10 DIAGNOSIS — C50.112 MALIGNANT NEOPLASM OF CENTRAL PORTION OF LEFT BREAST IN FEMALE, ESTROGEN RECEPTOR POSITIVE: Primary | ICD-10-CM

## 2025-07-10 DIAGNOSIS — Z79.899 IMMUNODEFICIENCY DUE TO DRUGS: ICD-10-CM

## 2025-07-10 DIAGNOSIS — D84.821 IMMUNODEFICIENCY DUE TO DRUGS: ICD-10-CM

## 2025-07-10 NOTE — PROGRESS NOTES
[facility]/ [facility]   at [facility] Note:    The patient location is: [place]  The chief complaint leading to consultation is: breast cancer    Visit type: audiovisual      Each patient to whom he or she provides medical services by telemedicine is:  (1) informed of the relationship between the physician and patient and the respective role of any other health care provider with respect to management of the patient; and (2) notified that he or she may decline to receive medical services by telemedicine and may withdraw from such care at any time.    Notes:       Chief Complaint:   Encounter Diagnoses   Name Primary?    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 female who presents today for follow up. She is on letrozole/kisqali/zoladex. Her zoladex has been moved to [place]  She went to  over the weekend with dizziness and had fluids in her ears. She is now on a nasal spray to help it.     Now on 15mg lexapro with improvement. Not feeling as fatigued.       Oncology History  Patient self palpated a left breast mass in December 2023 but had delay due to relocation  Bilateral breast ultrasound 6/21/24 showed L breast nodule at 6oc posterior to NAC     L breast bx 7/2/24: IDC, grade 2, ER >95%/ RI >95%/ HER2 1+; Ki67 5%    Bilateral mastectomy SLNBx 8/2/24: IDC, grade 2, 1.7cm maximal dimension; 1/1 LN+ with EXE  L axillary dissection 0/8 LN+     Invitae 7/15/24 negative   TC x 4 - 10/4/24 - 12/2/24    Tamoxifen March 2025  Zoladex started April 2025  Changed  tamoxifen to anastrozole May 2025  Started kisqali May 2025    Gyn History:   Menarche: 10  Menopause: premenopause; LMP 2024    Age at first pregnancy: 21    Patient Active Problem List   Diagnosis    Bilateral thumb pain    Malignant tumor of breast    Chemotherapy-induced nausea    Anxious mood as adjustment reaction    Iron deficiency anemia    Secondary malignant neoplasm of axilla    Body mass index 27.0-27.9, adult    Invasive ductal carcinoma of breast, female, left    Vertigo       Current Outpatient Medications   Medication Instructions    acetaminophen (TYLENOL) 1,000 mg    anastrozole (ARIMIDEX) 1 mg, Oral, Daily    azelastine (ASTELIN) 274 mcg, Nasal, 2 times daily    celecoxib (CELEBREX) 100 mg    EScitalopram oxalate (LEXAPRO) 5 mg, Oral, Daily    gabapentin (NEURONTIN) 300 mg    loratadine (CLARITIN) 10 mg, Daily    ondansetron (ZOFRAN) 8 mg, Oral, Every 8 hours PRN    ribociclib (KISQALI) 400 mg/day (200 mg x 2) Tab Take 2 tablets (400 mg) by mouth once daily For 21 days on and 7 days off.    semaglutide (OZEMPIC) 0.25 mg or 0.5 mg (2 mg/3 mL) pen injector Subcutaneous    ZEPBOUND 10 mg, Subcutaneous, Every 7 days       Review of Systems:   Review of Systems   Constitutional: Negative.    HENT: Negative.     Respiratory: Negative.  Negative for cough and shortness of breath.    Cardiovascular: Negative.  Negative for chest pain.   Gastrointestinal: Negative.  Negative for abdominal pain and diarrhea.   Genitourinary:  Negative for frequency.   Musculoskeletal: Negative.  Negative for back pain.        Generalized aches   Skin:  Negative for rash.   Neurological:  Negative for headaches.   Endo/Heme/Allergies: Negative.    Psychiatric/Behavioral:  The patient is not nervous/anxious.    All other systems reviewed and are negative.      PHYSICAL EXAM:  Virtual visit     Pertinent Labs & Imaging:  Pathology Results  (Last 30 days)      None            No results found for this or any previous  visit (from the past 24 hours).        Assessment & Plan:    1. 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 Secondary malignant neoplasm of axilla    3. Immunodeficiency due to drugs      Patient with stage I ER+ breast cancer with 1/9 LN+  Opted for TC x 4 over , completed (12/2/24)  Pending reconstruction. No radiation   Currently on zoladex, with anastrozole and kisqali (May 2030 and May 2028)  Repeat labs as above   Follow up 8 weeks     Continue on lexapro 15mg  Started on GLP1 with improvement         Route Chart for Scheduling    Med Onc Chart Routing      Follow up with physician 2 months. virtual   Follow up with RAMYA    Infusion scheduling note    Injection scheduling note    Labs CMP and CBC   Scheduling:  Preferred lab:  Lab interval: every 8 weeks     Imaging    Pharmacy appointment    Other referrals              Supportive Plan Information  OP BREAST GOSERELIN Q4W Ana Shipley MD   Associated Diagnosis: Malignant tumor of breast Stage IA pT1c, pN1a, cM0, G2, ER+, KS+, HER2: Equivocal noted on 8/3/2024   Line of treatment: Supportive Care   Treatment goal: Supportive     Upcoming Treatment Dates - OP BREAST GOSERELIN Q4W    8/2/2025       Chemotherapy       goserelin (ZOLADEX) injection 3.6 mg  8/30/2025       Chemotherapy       goserelin (ZOLADEX) injection 3.6 mg    Therapy Plan Information  IRON DEXTRAN  IV for Iron deficiency anemia, noted on 11/25/2024  Pre-Medications  acetaminophen tablet 1,000 mg  1,000 mg, Oral, Once  famotidine (PF) injection 20 mg  20 mg, Intravenous, Once  diphenhydrAMINE (BENADRYL) 50 mg in NS 50 mL IVPB  50 mg, Intravenous, Once  Medications  iron dextran (Infed) 25 mg in 0.9% NaCl 100 mL IVPB  25 mg, Intravenous, Once  iron dextran (Infed) 975 mg in 0.9% NaCl 500 mL IVPB  975 mg, Intravenous, Once  Anaphylaxis/Hypersensitivity  EPINEPHrine (EPIPEN) 0.3 mg/0.3 mL pen injection 0.3 mg  0.3 mg, Intramuscular,  PRN  diphenhydrAMINE injection 50 mg  50 mg, Intravenous, PRN  hydrocortisone sodium succinate injection 100 mg  100 mg, Intravenous, PRN  Flushes  0.9% NaCl injection 10 mL  10 mL, Intravenous, Once  heparin, porcine (PF) 100 unit/mL injection flush 500 Units  500 Units, Intravenous, PRN  0.9% NaCl 100 mL flush bag  Intravenous, Once      No therapy plan of the specified type found.    No therapy plan of the specified type found.    MDM includes  :    - Acute or chronic illness or injury that poses a threat to life or bodily function  - Independent review and explanation of 3+ results from unique tests  - Discussion of management and ordering 3+ unique tests  - Extensive discussion of treatment and management  - Prescription drug management  - Drug therapy requiring intensive monitoring for toxicity      Ana Shipley MD   07/10/2025

## 2025-08-04 ENCOUNTER — PATIENT MESSAGE (OUTPATIENT)
Dept: INTERNAL MEDICINE | Facility: CLINIC | Age: 45
End: 2025-08-04
Payer: COMMERCIAL

## 2025-08-04 PROCEDURE — 99499 UNLISTED E&M SERVICE: CPT | Mod: S$GLB,,, | Performed by: PHARMACIST

## 2025-08-04 RX ORDER — TIRZEPATIDE 12.5 MG/.5ML
12.5 INJECTION, SOLUTION SUBCUTANEOUS
Qty: 2 ML | Refills: 1 | Status: ACTIVE | OUTPATIENT
Start: 2025-08-04

## 2025-08-04 NOTE — TELEPHONE ENCOUNTER
Pt confirmed general tolerability with Zepbound 10 mg. To avoid unnecessary delays in titration, sent order to [facility] for Zepbound 12.5 mg. Will continue with scheduled follow-up.

## 2025-08-07 ENCOUNTER — LAB VISIT (OUTPATIENT)
Dept: LAB | Facility: HOSPITAL | Age: 45
End: 2025-08-07
Attending: INTERNAL MEDICINE
Payer: COMMERCIAL

## 2025-08-07 DIAGNOSIS — C79.89 SECONDARY MALIGNANT NEOPLASM OF AXILLA: ICD-10-CM

## 2025-08-07 DIAGNOSIS — Z17.0 MALIGNANT NEOPLASM OF CENTRAL PORTION OF LEFT BREAST IN FEMALE, ESTROGEN RECEPTOR POSITIVE: ICD-10-CM

## 2025-08-07 DIAGNOSIS — C50.112 MALIGNANT NEOPLASM OF CENTRAL PORTION OF LEFT BREAST IN FEMALE, ESTROGEN RECEPTOR POSITIVE: ICD-10-CM

## 2025-08-07 LAB
ABSOLUTE EOSINOPHIL (OHS): 0.24 K/UL
ABSOLUTE MONOCYTE (OHS): 0.32 K/UL (ref 0.3–1)
ABSOLUTE NEUTROPHIL COUNT (OHS): 1.97 K/UL (ref 1.8–7.7)
ALBUMIN SERPL BCP-MCNC: 4.4 G/DL (ref 3.5–5.2)
ALP SERPL-CCNC: 85 UNIT/L (ref 40–150)
ALT SERPL W/O P-5'-P-CCNC: 553 UNIT/L (ref 10–44)
ANION GAP (OHS): 8 MMOL/L (ref 8–16)
AST SERPL-CCNC: 156 UNIT/L (ref 11–45)
BASOPHILS # BLD AUTO: 0.15 K/UL
BASOPHILS NFR BLD AUTO: 3.5 %
BILIRUB SERPL-MCNC: 0.3 MG/DL (ref 0.1–1)
BUN SERPL-MCNC: 15 MG/DL (ref 6–20)
CALCIUM SERPL-MCNC: 9.7 MG/DL (ref 8.7–10.5)
CHLORIDE SERPL-SCNC: 105 MMOL/L (ref 95–110)
CO2 SERPL-SCNC: 27 MMOL/L (ref 23–29)
CREAT SERPL-MCNC: 1 MG/DL (ref 0.5–1.4)
ERYTHROCYTE [DISTWIDTH] IN BLOOD BY AUTOMATED COUNT: 14.3 % (ref 11.5–14.5)
GFR SERPLBLD CREATININE-BSD FMLA CKD-EPI: >60 ML/MIN/1.73/M2
GLUCOSE SERPL-MCNC: 82 MG/DL (ref 70–110)
HCT VFR BLD AUTO: 36.4 % (ref 37–48.5)
HGB BLD-MCNC: 12.2 GM/DL (ref 12–16)
IMM GRANULOCYTES # BLD AUTO: 0.01 K/UL (ref 0–0.04)
IMM GRANULOCYTES NFR BLD AUTO: 0.2 % (ref 0–0.5)
LYMPHOCYTES # BLD AUTO: 1.62 K/UL (ref 1–4.8)
MCH RBC QN AUTO: 30.8 PG (ref 27–31)
MCHC RBC AUTO-ENTMCNC: 33.5 G/DL (ref 32–36)
MCV RBC AUTO: 92 FL (ref 82–98)
NUCLEATED RBC (/100WBC) (OHS): 0 /100 WBC
PLATELET # BLD AUTO: 287 K/UL (ref 150–450)
PMV BLD AUTO: 9.3 FL (ref 9.2–12.9)
POTASSIUM SERPL-SCNC: 3.9 MMOL/L (ref 3.5–5.1)
PROT SERPL-MCNC: 7.5 GM/DL (ref 6–8.4)
RBC # BLD AUTO: 3.96 M/UL (ref 4–5.4)
RELATIVE EOSINOPHIL (OHS): 5.6 %
RELATIVE LYMPHOCYTE (OHS): 37.6 % (ref 18–48)
RELATIVE MONOCYTE (OHS): 7.4 % (ref 4–15)
RELATIVE NEUTROPHIL (OHS): 45.7 % (ref 38–73)
SODIUM SERPL-SCNC: 140 MMOL/L (ref 136–145)
WBC # BLD AUTO: 4.31 K/UL (ref 3.9–12.7)

## 2025-08-07 PROCEDURE — 36415 COLL VENOUS BLD VENIPUNCTURE: CPT

## 2025-08-07 PROCEDURE — 82040 ASSAY OF SERUM ALBUMIN: CPT

## 2025-08-07 PROCEDURE — 85025 COMPLETE CBC W/AUTO DIFF WBC: CPT

## 2025-08-08 ENCOUNTER — INFUSION (OUTPATIENT)
Dept: INFUSION THERAPY | Facility: HOSPITAL | Age: 45
End: 2025-08-08
Attending: INTERNAL MEDICINE
Payer: COMMERCIAL

## 2025-08-08 VITALS
SYSTOLIC BLOOD PRESSURE: 117 MMHG | RESPIRATION RATE: 18 BRPM | DIASTOLIC BLOOD PRESSURE: 66 MMHG | HEART RATE: 69 BPM | OXYGEN SATURATION: 99 % | TEMPERATURE: 98 F

## 2025-08-08 DIAGNOSIS — C50.919 MALIGNANT NEOPLASM OF BREAST IN FEMALE, ESTROGEN RECEPTOR POSITIVE, UNSPECIFIED LATERALITY, UNSPECIFIED SITE OF BREAST: Primary | ICD-10-CM

## 2025-08-08 DIAGNOSIS — Z17.0 MALIGNANT NEOPLASM OF BREAST IN FEMALE, ESTROGEN RECEPTOR POSITIVE, UNSPECIFIED LATERALITY, UNSPECIFIED SITE OF BREAST: Primary | ICD-10-CM

## 2025-08-08 PROCEDURE — 96402 CHEMO HORMON ANTINEOPL SQ/IM: CPT

## 2025-08-08 PROCEDURE — 63600175 PHARM REV CODE 636 W HCPCS: Mod: JZ,TB | Performed by: INTERNAL MEDICINE

## 2025-08-08 RX ADMIN — GOSERELIN ACETATE 3.6 MG: 3.6 IMPLANT SUBCUTANEOUS at 12:08

## 2025-08-08 NOTE — PLAN OF CARE
Patient tolerated Zoladex injection to RLQ per patient request. NAD noted. VSS. Rechecking labs in 1 week 8/15/25 per Dr Shipley request due to elevated liver enzymes. Discharged home and ambulated independently off unit.    4 = No assist / stand by assistance

## 2025-08-08 NOTE — PROGRESS NOTES
Spoke with patient about labs on 8/8/25.  Hold kisquali x 1 week and then recheck labs next week 8/15/25

## 2025-08-15 ENCOUNTER — LAB VISIT (OUTPATIENT)
Dept: LAB | Facility: HOSPITAL | Age: 45
End: 2025-08-15
Attending: INTERNAL MEDICINE
Payer: COMMERCIAL

## 2025-08-15 ENCOUNTER — PATIENT MESSAGE (OUTPATIENT)
Dept: HEMATOLOGY/ONCOLOGY | Facility: CLINIC | Age: 45
End: 2025-08-15
Payer: COMMERCIAL

## 2025-08-15 DIAGNOSIS — Z17.0 MALIGNANT NEOPLASM OF CENTRAL PORTION OF LEFT BREAST IN FEMALE, ESTROGEN RECEPTOR POSITIVE: ICD-10-CM

## 2025-08-15 DIAGNOSIS — C50.112 MALIGNANT NEOPLASM OF CENTRAL PORTION OF LEFT BREAST IN FEMALE, ESTROGEN RECEPTOR POSITIVE: ICD-10-CM

## 2025-08-15 DIAGNOSIS — C79.89 SECONDARY MALIGNANT NEOPLASM OF AXILLA: ICD-10-CM

## 2025-08-15 LAB
ABSOLUTE EOSINOPHIL (OHS): 0.19 K/UL
ABSOLUTE MONOCYTE (OHS): 0.42 K/UL (ref 0.3–1)
ABSOLUTE NEUTROPHIL COUNT (OHS): 1.9 K/UL (ref 1.8–7.7)
ALBUMIN SERPL BCP-MCNC: 4.3 G/DL (ref 3.5–5.2)
ALP SERPL-CCNC: 85 UNIT/L (ref 40–150)
ALT SERPL W/O P-5'-P-CCNC: 486 UNIT/L (ref 10–44)
ANION GAP (OHS): 9 MMOL/L (ref 8–16)
AST SERPL-CCNC: 178 UNIT/L (ref 11–45)
BASOPHILS # BLD AUTO: 0.09 K/UL
BASOPHILS NFR BLD AUTO: 2.2 %
BILIRUB SERPL-MCNC: 0.4 MG/DL (ref 0.1–1)
BUN SERPL-MCNC: 12 MG/DL (ref 6–20)
CALCIUM SERPL-MCNC: 9.6 MG/DL (ref 8.7–10.5)
CHLORIDE SERPL-SCNC: 104 MMOL/L (ref 95–110)
CO2 SERPL-SCNC: 28 MMOL/L (ref 23–29)
CREAT SERPL-MCNC: 0.9 MG/DL (ref 0.5–1.4)
ERYTHROCYTE [DISTWIDTH] IN BLOOD BY AUTOMATED COUNT: 14.3 % (ref 11.5–14.5)
GFR SERPLBLD CREATININE-BSD FMLA CKD-EPI: >60 ML/MIN/1.73/M2
GLUCOSE SERPL-MCNC: 86 MG/DL (ref 70–110)
HCT VFR BLD AUTO: 40.2 % (ref 37–48.5)
HGB BLD-MCNC: 13.2 GM/DL (ref 12–16)
IMM GRANULOCYTES # BLD AUTO: 0.01 K/UL (ref 0–0.04)
IMM GRANULOCYTES NFR BLD AUTO: 0.2 % (ref 0–0.5)
LYMPHOCYTES # BLD AUTO: 1.52 K/UL (ref 1–4.8)
MCH RBC QN AUTO: 30.4 PG (ref 27–31)
MCHC RBC AUTO-ENTMCNC: 32.8 G/DL (ref 32–36)
MCV RBC AUTO: 93 FL (ref 82–98)
NUCLEATED RBC (/100WBC) (OHS): 0 /100 WBC
PLATELET # BLD AUTO: 322 K/UL (ref 150–450)
PMV BLD AUTO: 9.6 FL (ref 9.2–12.9)
POTASSIUM SERPL-SCNC: 3.8 MMOL/L (ref 3.5–5.1)
PROT SERPL-MCNC: 7.6 GM/DL (ref 6–8.4)
RBC # BLD AUTO: 4.34 M/UL (ref 4–5.4)
RELATIVE EOSINOPHIL (OHS): 4.6 %
RELATIVE LYMPHOCYTE (OHS): 36.8 % (ref 18–48)
RELATIVE MONOCYTE (OHS): 10.2 % (ref 4–15)
RELATIVE NEUTROPHIL (OHS): 46 % (ref 38–73)
SODIUM SERPL-SCNC: 141 MMOL/L (ref 136–145)
WBC # BLD AUTO: 4.13 K/UL (ref 3.9–12.7)

## 2025-08-15 PROCEDURE — 80053 COMPREHEN METABOLIC PANEL: CPT

## 2025-08-15 PROCEDURE — 36415 COLL VENOUS BLD VENIPUNCTURE: CPT

## 2025-08-15 PROCEDURE — 85025 COMPLETE CBC W/AUTO DIFF WBC: CPT

## 2025-08-18 ENCOUNTER — TELEPHONE (OUTPATIENT)
Dept: OTOLARYNGOLOGY | Facility: CLINIC | Age: 45
End: 2025-08-18
Payer: COMMERCIAL

## 2025-08-18 ENCOUNTER — PATIENT MESSAGE (OUTPATIENT)
Dept: OTOLARYNGOLOGY | Facility: CLINIC | Age: 45
End: 2025-08-18
Payer: COMMERCIAL

## 2025-08-21 ENCOUNTER — OFFICE VISIT (OUTPATIENT)
Dept: UROLOGY | Facility: CLINIC | Age: 45
End: 2025-08-21
Payer: COMMERCIAL

## 2025-08-21 VITALS
HEART RATE: 79 BPM | SYSTOLIC BLOOD PRESSURE: 111 MMHG | WEIGHT: 137.81 LBS | BODY MASS INDEX: 28.8 KG/M2 | DIASTOLIC BLOOD PRESSURE: 74 MMHG

## 2025-08-21 DIAGNOSIS — N39.3 STRESS INCONTINENCE: Primary | ICD-10-CM

## 2025-08-21 PROCEDURE — 99204 OFFICE O/P NEW MOD 45 MIN: CPT | Mod: S$GLB,,,

## 2025-08-21 PROCEDURE — 1160F RVW MEDS BY RX/DR IN RCRD: CPT | Mod: CPTII,S$GLB,,

## 2025-08-21 PROCEDURE — 99999 PR PBB SHADOW E&M-EST. PATIENT-LVL III: CPT | Mod: PBBFAC,,,

## 2025-08-21 PROCEDURE — 3078F DIAST BP <80 MM HG: CPT | Mod: CPTII,S$GLB,,

## 2025-08-21 PROCEDURE — 3008F BODY MASS INDEX DOCD: CPT | Mod: CPTII,S$GLB,,

## 2025-08-21 PROCEDURE — 3074F SYST BP LT 130 MM HG: CPT | Mod: CPTII,S$GLB,,

## 2025-08-21 PROCEDURE — 1159F MED LIST DOCD IN RCRD: CPT | Mod: CPTII,S$GLB,,

## 2025-08-21 RX ORDER — LIDOCAINE HYDROCHLORIDE 20 MG/ML
JELLY TOPICAL ONCE
OUTPATIENT
Start: 2025-08-21 | End: 2025-08-21

## 2025-08-22 ENCOUNTER — LAB VISIT (OUTPATIENT)
Dept: LAB | Facility: HOSPITAL | Age: 45
End: 2025-08-22
Attending: INTERNAL MEDICINE
Payer: COMMERCIAL

## 2025-08-22 DIAGNOSIS — C50.112 MALIGNANT NEOPLASM OF CENTRAL PORTION OF LEFT BREAST IN FEMALE, ESTROGEN RECEPTOR POSITIVE: ICD-10-CM

## 2025-08-22 DIAGNOSIS — C79.89 SECONDARY MALIGNANT NEOPLASM OF AXILLA: ICD-10-CM

## 2025-08-22 DIAGNOSIS — Z17.0 MALIGNANT NEOPLASM OF CENTRAL PORTION OF LEFT BREAST IN FEMALE, ESTROGEN RECEPTOR POSITIVE: ICD-10-CM

## 2025-08-22 LAB
ABSOLUTE EOSINOPHIL (OHS): 0.19 K/UL
ABSOLUTE MONOCYTE (OHS): 0.35 K/UL (ref 0.3–1)
ABSOLUTE NEUTROPHIL COUNT (OHS): 2.36 K/UL (ref 1.8–7.7)
ALBUMIN SERPL BCP-MCNC: 4.3 G/DL (ref 3.5–5.2)
ALP SERPL-CCNC: 85 UNIT/L (ref 40–150)
ALT SERPL W/O P-5'-P-CCNC: 599 UNIT/L (ref 10–44)
ANION GAP (OHS): 9 MMOL/L (ref 8–16)
AST SERPL-CCNC: 241 UNIT/L (ref 11–45)
BASOPHILS # BLD AUTO: 0.1 K/UL
BASOPHILS NFR BLD AUTO: 2.2 %
BILIRUB SERPL-MCNC: 0.5 MG/DL (ref 0.1–1)
BUN SERPL-MCNC: 12 MG/DL (ref 6–20)
CALCIUM SERPL-MCNC: 9.8 MG/DL (ref 8.7–10.5)
CHLORIDE SERPL-SCNC: 105 MMOL/L (ref 95–110)
CO2 SERPL-SCNC: 27 MMOL/L (ref 23–29)
CREAT SERPL-MCNC: 0.8 MG/DL (ref 0.5–1.4)
ERYTHROCYTE [DISTWIDTH] IN BLOOD BY AUTOMATED COUNT: 14 % (ref 11.5–14.5)
GFR SERPLBLD CREATININE-BSD FMLA CKD-EPI: >60 ML/MIN/1.73/M2
GLUCOSE SERPL-MCNC: 91 MG/DL (ref 70–110)
HCT VFR BLD AUTO: 39.7 % (ref 37–48.5)
HGB BLD-MCNC: 13.4 GM/DL (ref 12–16)
IMM GRANULOCYTES # BLD AUTO: 0.01 K/UL (ref 0–0.04)
IMM GRANULOCYTES NFR BLD AUTO: 0.2 % (ref 0–0.5)
LYMPHOCYTES # BLD AUTO: 1.57 K/UL (ref 1–4.8)
MCH RBC QN AUTO: 30.9 PG (ref 27–31)
MCHC RBC AUTO-ENTMCNC: 33.8 G/DL (ref 32–36)
MCV RBC AUTO: 92 FL (ref 82–98)
NUCLEATED RBC (/100WBC) (OHS): 0 /100 WBC
PLATELET # BLD AUTO: 214 K/UL (ref 150–450)
PMV BLD AUTO: 9.9 FL (ref 9.2–12.9)
POTASSIUM SERPL-SCNC: 4.2 MMOL/L (ref 3.5–5.1)
PROT SERPL-MCNC: 7.5 GM/DL (ref 6–8.4)
RBC # BLD AUTO: 4.34 M/UL (ref 4–5.4)
RELATIVE EOSINOPHIL (OHS): 4.1 %
RELATIVE LYMPHOCYTE (OHS): 34.3 % (ref 18–48)
RELATIVE MONOCYTE (OHS): 7.6 % (ref 4–15)
RELATIVE NEUTROPHIL (OHS): 51.6 % (ref 38–73)
SODIUM SERPL-SCNC: 141 MMOL/L (ref 136–145)
WBC # BLD AUTO: 4.58 K/UL (ref 3.9–12.7)

## 2025-08-22 PROCEDURE — 84075 ASSAY ALKALINE PHOSPHATASE: CPT

## 2025-08-22 PROCEDURE — 85025 COMPLETE CBC W/AUTO DIFF WBC: CPT

## 2025-08-22 PROCEDURE — 36415 COLL VENOUS BLD VENIPUNCTURE: CPT

## 2025-08-27 ENCOUNTER — TELEPHONE (OUTPATIENT)
Dept: UROLOGY | Facility: CLINIC | Age: 45
End: 2025-08-27
Payer: COMMERCIAL

## 2025-08-27 ENCOUNTER — PROCEDURE VISIT (OUTPATIENT)
Facility: CLINIC | Age: 45
End: 2025-08-27
Payer: COMMERCIAL

## 2025-08-27 ENCOUNTER — HOSPITAL ENCOUNTER (OUTPATIENT)
Dept: RADIOLOGY | Facility: HOSPITAL | Age: 45
Discharge: HOME | End: 2025-08-27
Payer: COMMERCIAL

## 2025-08-27 VITALS
WEIGHT: 138.25 LBS | SYSTOLIC BLOOD PRESSURE: 103 MMHG | BODY MASS INDEX: 28.89 KG/M2 | HEART RATE: 81 BPM | RESPIRATION RATE: 16 BRPM | TEMPERATURE: 98 F | DIASTOLIC BLOOD PRESSURE: 71 MMHG

## 2025-08-27 DIAGNOSIS — N39.3 STRESS INCONTINENCE: Primary | ICD-10-CM

## 2025-08-27 DIAGNOSIS — N39.3 STRESS INCONTINENCE: ICD-10-CM

## 2025-08-27 DIAGNOSIS — R35.0 URINARY FREQUENCY: Primary | ICD-10-CM

## 2025-08-27 DIAGNOSIS — R39.15 URINARY URGENCY: ICD-10-CM

## 2025-08-27 PROCEDURE — 74450 X-RAY URETHRA/BLADDER: CPT | Mod: TC

## 2025-08-27 RX ORDER — LIDOCAINE HYDROCHLORIDE 20 MG/ML
JELLY TOPICAL
Status: COMPLETED | OUTPATIENT
Start: 2025-08-27 | End: 2025-08-27

## 2025-08-27 RX ADMIN — LIDOCAINE HYDROCHLORIDE: 20 JELLY TOPICAL at 08:08

## 2025-09-03 DIAGNOSIS — Z17.0 MALIGNANT NEOPLASM OF CENTRAL PORTION OF LEFT BREAST IN FEMALE, ESTROGEN RECEPTOR POSITIVE: ICD-10-CM

## 2025-09-03 DIAGNOSIS — C79.89 SECONDARY MALIGNANT NEOPLASM OF AXILLA: ICD-10-CM

## 2025-09-03 DIAGNOSIS — C50.112 MALIGNANT NEOPLASM OF CENTRAL PORTION OF LEFT BREAST IN FEMALE, ESTROGEN RECEPTOR POSITIVE: ICD-10-CM

## 2025-09-03 RX ORDER — ESCITALOPRAM OXALATE 5 MG/1
5 TABLET ORAL DAILY
Qty: 30 TABLET | Refills: 2 | Status: SHIPPED | OUTPATIENT
Start: 2025-09-03 | End: 2026-09-03

## 2025-09-04 ENCOUNTER — OFFICE VISIT (OUTPATIENT)
Dept: OBSTETRICS AND GYNECOLOGY | Facility: CLINIC | Age: 45
End: 2025-09-04
Payer: COMMERCIAL

## 2025-09-04 VITALS
HEART RATE: 78 BPM | HEIGHT: 58 IN | BODY MASS INDEX: 29.6 KG/M2 | SYSTOLIC BLOOD PRESSURE: 123 MMHG | WEIGHT: 141 LBS | DIASTOLIC BLOOD PRESSURE: 85 MMHG

## 2025-09-04 DIAGNOSIS — L70.0 ACNE VULGARIS: ICD-10-CM

## 2025-09-04 DIAGNOSIS — N95.1 MENOPAUSAL SYMPTOMS: ICD-10-CM

## 2025-09-04 DIAGNOSIS — Z01.419 WELL WOMAN EXAM WITH ROUTINE GYNECOLOGICAL EXAM: Primary | ICD-10-CM

## 2025-09-04 PROCEDURE — 1159F MED LIST DOCD IN RCRD: CPT | Mod: CPTII,S$GLB,, | Performed by: STUDENT IN AN ORGANIZED HEALTH CARE EDUCATION/TRAINING PROGRAM

## 2025-09-04 PROCEDURE — 99999 PR PBB SHADOW E&M-EST. PATIENT-LVL III: CPT | Mod: PBBFAC,,, | Performed by: STUDENT IN AN ORGANIZED HEALTH CARE EDUCATION/TRAINING PROGRAM

## 2025-09-04 PROCEDURE — 3008F BODY MASS INDEX DOCD: CPT | Mod: CPTII,S$GLB,, | Performed by: STUDENT IN AN ORGANIZED HEALTH CARE EDUCATION/TRAINING PROGRAM

## 2025-09-04 PROCEDURE — 3074F SYST BP LT 130 MM HG: CPT | Mod: CPTII,S$GLB,, | Performed by: STUDENT IN AN ORGANIZED HEALTH CARE EDUCATION/TRAINING PROGRAM

## 2025-09-04 PROCEDURE — 3079F DIAST BP 80-89 MM HG: CPT | Mod: CPTII,S$GLB,, | Performed by: STUDENT IN AN ORGANIZED HEALTH CARE EDUCATION/TRAINING PROGRAM

## 2025-09-04 PROCEDURE — 99386 PREV VISIT NEW AGE 40-64: CPT | Mod: S$GLB,,, | Performed by: STUDENT IN AN ORGANIZED HEALTH CARE EDUCATION/TRAINING PROGRAM

## 2025-09-04 RX ORDER — TRETINOIN 0.5 MG/G
CREAM TOPICAL NIGHTLY
Qty: 45 G | Refills: 3 | Status: SHIPPED | OUTPATIENT
Start: 2025-09-04

## 2025-09-05 ENCOUNTER — E-CONSULT (OUTPATIENT)
Dept: HEPATOLOGY | Facility: CLINIC | Age: 45
End: 2025-09-05
Payer: COMMERCIAL

## 2025-09-05 ENCOUNTER — INFUSION (OUTPATIENT)
Dept: INFUSION THERAPY | Facility: HOSPITAL | Age: 45
End: 2025-09-05
Attending: INTERNAL MEDICINE
Payer: COMMERCIAL

## 2025-09-05 VITALS
HEART RATE: 57 BPM | OXYGEN SATURATION: 99 % | TEMPERATURE: 98 F | DIASTOLIC BLOOD PRESSURE: 79 MMHG | RESPIRATION RATE: 18 BRPM | SYSTOLIC BLOOD PRESSURE: 131 MMHG

## 2025-09-05 DIAGNOSIS — Z17.0 MALIGNANT NEOPLASM OF BREAST IN FEMALE, ESTROGEN RECEPTOR POSITIVE, UNSPECIFIED LATERALITY, UNSPECIFIED SITE OF BREAST: Primary | ICD-10-CM

## 2025-09-05 DIAGNOSIS — R79.89 ELEVATED LFTS: Primary | ICD-10-CM

## 2025-09-05 DIAGNOSIS — C50.919 MALIGNANT NEOPLASM OF BREAST IN FEMALE, ESTROGEN RECEPTOR POSITIVE, UNSPECIFIED LATERALITY, UNSPECIFIED SITE OF BREAST: Primary | ICD-10-CM

## 2025-09-05 DIAGNOSIS — R74.8 ELEVATED LIVER ENZYMES: Primary | ICD-10-CM

## 2025-09-05 PROCEDURE — 96402 CHEMO HORMON ANTINEOPL SQ/IM: CPT

## 2025-09-05 PROCEDURE — 63600175 PHARM REV CODE 636 W HCPCS: Mod: JZ,TB | Performed by: INTERNAL MEDICINE

## 2025-09-05 RX ADMIN — GOSERELIN ACETATE 3.6 MG: 3.6 IMPLANT SUBCUTANEOUS at 12:09

## 2025-09-06 ENCOUNTER — OFFICE VISIT (OUTPATIENT)
Dept: OPTOMETRY | Facility: CLINIC | Age: 45
End: 2025-09-06
Payer: COMMERCIAL

## 2025-09-06 DIAGNOSIS — H52.203 MYOPIA OF BOTH EYES WITH ASTIGMATISM AND PRESBYOPIA: ICD-10-CM

## 2025-09-06 DIAGNOSIS — H52.13 MYOPIA OF BOTH EYES WITH ASTIGMATISM AND PRESBYOPIA: ICD-10-CM

## 2025-09-06 DIAGNOSIS — H52.4 MYOPIA OF BOTH EYES WITH ASTIGMATISM AND PRESBYOPIA: ICD-10-CM

## 2025-09-06 DIAGNOSIS — Z01.00 EYE EXAM, ROUTINE: Primary | ICD-10-CM

## 2025-09-06 PROCEDURE — 99999 PR PBB SHADOW E&M-EST. PATIENT-LVL III: CPT | Mod: PBBFAC,,, | Performed by: OPTOMETRIST
